# Patient Record
Sex: FEMALE | Race: WHITE | Employment: FULL TIME | URBAN - METROPOLITAN AREA
[De-identification: names, ages, dates, MRNs, and addresses within clinical notes are randomized per-mention and may not be internally consistent; named-entity substitution may affect disease eponyms.]

---

## 2017-09-25 ENCOUNTER — HOSPITAL ENCOUNTER (OUTPATIENT)
Dept: SURGERY | Age: 57
Discharge: HOME | End: 2017-09-25

## 2017-09-27 VITALS — BODY MASS INDEX: 33.43 KG/M2 | HEIGHT: 67 IN | WEIGHT: 213 LBS

## 2017-09-27 RX ORDER — BACLOFEN 10 MG/1
10 TABLET ORAL 2 TIMES DAILY
COMMUNITY
End: 2021-12-20 | Stop reason: CLARIF

## 2017-09-27 RX ORDER — NAPROXEN 500 MG/1
500 TABLET ORAL 2 TIMES DAILY WITH MEALS
COMMUNITY
End: 2021-12-20 | Stop reason: CLARIF

## 2017-09-27 RX ORDER — GABAPENTIN 300 MG/1
300 CAPSULE ORAL
COMMUNITY

## 2017-09-27 RX ORDER — LEVOTHYROXINE SODIUM 150 UG/1
100 TABLET ORAL
COMMUNITY

## 2017-09-27 RX ORDER — MULTIVIT WITH MINERALS/HERBS
1 TABLET ORAL DAILY
COMMUNITY

## 2017-09-27 RX ORDER — NEBIVOLOL 20 MG/1
20 TABLET ORAL
COMMUNITY

## 2017-09-27 RX ORDER — HYDRALAZINE HYDROCHLORIDE 50 MG/1
50 TABLET, FILM COATED ORAL 3 TIMES DAILY
COMMUNITY

## 2017-09-27 RX ORDER — DOXAZOSIN 4 MG/1
4 TABLET ORAL
COMMUNITY
End: 2021-12-20 | Stop reason: CLARIF

## 2017-09-27 RX ORDER — TRAMADOL HYDROCHLORIDE 50 MG/1
50 TABLET ORAL
COMMUNITY
End: 2021-12-20 | Stop reason: CLARIF

## 2017-09-27 RX ORDER — DILTIAZEM HYDROCHLORIDE EXTENDED-RELEASE TABLETS 240 MG/1
240 TABLET, EXTENDED RELEASE ORAL
COMMUNITY
End: 2021-12-20 | Stop reason: CLARIF

## 2017-09-27 NOTE — PERIOP NOTES
Patient verified name, , and surgery as listed in [facility]. PHONE ASSESSMENT     TYPE  CASE:N/A            Orders: received. Labs per surgeon:  None ordered    Labs per anesthesia protocol: N/A  EKG  :  N/A    Pt medications obtained  PT OVER PHONE, med bottles NOT visualized. Requested pt to validate each medication, dose and frequency while here today. Instructed patient to continue previous medications as prescribed prior to surgery and  to take the following medications the day of surgery according to anesthesia guidelines with a small sip of water : INSTRUCTED PTS TO TAKE ALL MEDS AS USUAL W/ SMALL SIP OF WATER, OTHERWISE NPO AFTER MIDNIGHT       Continue all previous medications unless otherwise directed. Instructed patient to hold  the following medications prior to surgery: NONE    Patient instructed on the following and verbalized understanding: Pt teach back was successful and pt demonstrates knowledge of instruction  Arrive at [facility] entrance, time of arrival to be called the day before by 1700. Responsible adult must drive patient to and from hospital, stay during surgery and 24 hours postoperatively. Npo after midnight including gum, mints and ice chips. Use ANTIBACTERIAL SOAP  in the shower the night before and the morning of surgery. Leave all valuables at home. Instructed on no jewelry or body piercings on the dos. Bring insurance card and ID. No perfumes, oil, powder, colognes, makeup or  lotions on the skin. Patient verbalized understanding of all instructions and provided all medical/health information to the best of their ability.

## 2017-10-11 NOTE — PERIOP NOTES
Phone update completed. Previous assessment completed 9/27/17. Pt reports no changes in health history.

## 2017-10-12 ENCOUNTER — APPOINTMENT (OUTPATIENT)
Dept: GENERAL RADIOLOGY | Age: 57
End: 2017-10-12
Attending: PAIN MEDICINE
Payer: COMMERCIAL

## 2017-10-12 ENCOUNTER — HOSPITAL ENCOUNTER (OUTPATIENT)
Age: 57
Setting detail: OUTPATIENT SURGERY
Discharge: HOME | End: 2017-10-12
Attending: PAIN MEDICINE | Admitting: PAIN MEDICINE
Payer: COMMERCIAL

## 2017-10-12 VITALS
WEIGHT: 224 LBS | TEMPERATURE: 98 F | RESPIRATION RATE: 16 BRPM | SYSTOLIC BLOOD PRESSURE: 140 MMHG | HEART RATE: 52 BPM | OXYGEN SATURATION: 98 % | DIASTOLIC BLOOD PRESSURE: 84 MMHG | BODY MASS INDEX: 35.08 KG/M2

## 2017-10-12 PROCEDURE — 74011250636 HC RX REV CODE- 250/636: Performed by: PAIN MEDICINE

## 2017-10-12 PROCEDURE — 76010000230: Performed by: PAIN MEDICINE

## 2017-10-12 PROCEDURE — 76210000021 HC REC RM PH II 0.5 TO 1 HR: Performed by: PAIN MEDICINE

## 2017-10-12 PROCEDURE — 77030014125 HC TY EPDRL BBMI -B: Performed by: PAIN MEDICINE

## 2017-10-12 PROCEDURE — 74011250636 HC RX REV CODE- 250/636

## 2017-10-12 RX ORDER — FENTANYL CITRATE 50 UG/ML
INJECTION, SOLUTION INTRAMUSCULAR; INTRAVENOUS AS NEEDED
Status: DISCONTINUED | OUTPATIENT
Start: 2017-10-12 | End: 2017-10-12 | Stop reason: HOSPADM

## 2017-10-12 RX ORDER — TRIAMCINOLONE ACETONIDE 40 MG/ML
INJECTION, SUSPENSION INTRA-ARTICULAR; INTRAMUSCULAR AS NEEDED
Status: DISCONTINUED | OUTPATIENT
Start: 2017-10-12 | End: 2017-10-12 | Stop reason: HOSPADM

## 2017-10-12 NOTE — INTERVAL H&P NOTE
H&P Update:  Phong Mathews was seen and examined. History and physical has been reviewed. The patient has been examined. There have been no significant clinical changes since the completion of the originally dated History and Physical.  Blood pressure (!) 173/107, pulse 78, temperature 98.3 °F (36.8 °C), resp. rate 18, weight 101.6 kg (224 lb), SpO2 96 %.         Signed By: Maureen Obando MD     October 12, 2017 7:47 AM

## 2017-10-12 NOTE — IP AVS SNAPSHOT
[address] 
[phone] Patient: Duane Haller MRN: [id number] :1960 You are allergic to the following Allergen Reactions Bactrim (Sulfamethoprim) Hives Codeine Hives Lortab (Hydrocodone-Acetaminophen) Hives Sulfa (Sulfonamide Antibiotics) Hives Recent Documentation Weight BMI OB Status Smoking Status 101.6 kg 35.08 kg/m2 Postmenopausal Former Smoker Emergency Contacts Name Discharge Info Relation Home Work Mobile Leon Vega  Spouse [3] [phone] About your hospitalization You were admitted on:  2017 You last received care in the:  [place] [address] You were discharged on:  2017 Unit phone number:  [phone] Why you were hospitalized Your primary diagnosis was:  Not on File Providers Seen During Your Hospitalizations Provider Role Specialty Primary office phone Rigo Alejandra MD Attending Provider Anesthesiology [phone] Your Primary Care Physician (PCP) Primary Care Physician Office Phone Office Fax [facility] [phone] Follow-up Information Follow up With Details Comments Contact Info tSephenie Mckeon MD   [facility] [address] [facility] [address] [phone] Current Discharge Medication List  
  
CONTINUE these medications which have NOT CHANGED Dose & Instructions Dispensing Information Comments Morning Noon Evening Bedtime  
 b complex vitamins tablet Your last dose was: Your next dose is:    
   
   
 Dose:  1 Tab Take 1 Tab by mouth daily. Refills:  0  
     
   
   
   
  
 baclofen 10 mg tablet Commonly known as:  LIORESAL Your last dose was: Your next dose is:    
   
   
 Dose:  10 mg Take 10 mg by mouth two (2) times a day. Refills:  0  
     
   
   
   
  
 BYSTOLIC 20 mg tablet Generic drug:  nebivolol Your last dose was: Your next dose is:    
   
   
 Dose:  20 mg Take 20 mg by mouth every morning. Refills:  0  
     
   
   
   
  
 dilTIAZem  mg Tb24 tablet Commonly known as:  CARDIZEM LA Your last dose was: Your next dose is:    
   
   
 Dose:  240 mg Take 240 mg by mouth daily (with lunch). Refills:  0  
     
   
   
   
  
 doxazosin 4 mg tablet Commonly known as:  CARDURA Your last dose was: Your next dose is:    
   
   
 Dose:  4 mg Take 4 mg by mouth nightly. Refills:  0  
     
   
   
   
  
 gabapentin 300 mg capsule Commonly known as:  NEURONTIN Your last dose was: Your next dose is:    
   
   
 Dose:  300 mg Take 300 mg by mouth three (3) times daily as needed. Refills:  0  
     
   
   
   
  
 hydrALAZINE 50 mg tablet Commonly known as:  APRESOLINE Your last dose was: Your next dose is:    
   
   
 Dose:  50 mg Take 50 mg by mouth three (3) times daily. Refills:  0  
     
   
   
   
  
 levothyroxine 150 mcg tablet Commonly known as:  SYNTHROID Your last dose was: Your next dose is:    
   
   
 Dose:  150 mcg Take 150 mcg by mouth Daily (before breakfast). Refills:  0  
     
   
   
   
  
 loratadine 10 mg Cap Your last dose was: Your next dose is: Take  by mouth nightly. Refills:  0  
     
   
   
   
  
 naproxen 500 mg tablet Commonly known as:  NAPROSYN Your last dose was: Your next dose is:    
   
   
 Dose:  500 mg Take 500 mg by mouth two (2) times daily (with meals). Refills:  0  
     
   
   
   
  
 traMADol 50 mg tablet Commonly known as:  ULTRAM  
   
Your last dose was: Your next dose is:    
   
   
 Dose:  50 mg Take 50 mg by mouth three (3) times daily as needed for Pain. Refills:  0 VITAMIN D2 PO Your last dose was: Your next dose is:    
   
   
 Dose:  1 Tab Take 1 Tab by mouth. Twice a week Refills:  0 Discharge Instructions Pain Management Aftercare Common Side Effects that may last 8-12 hours: 
Drowsiness  Slurred speech  Dizziness Poor balance  Blurred vision Hangover effect (headache, upset stomach, etc.) Aftercare Instructions: You must have a responsible adult drive you home. Do not drive a car or operate equipment for at least 12 hours. Do not take any new medications for at least 24 hours unless your doctor has prescribed them and he is aware that you are taking them. Do not drink any alcoholic beverages until the next day. Advance to your normal diet as tolerated. Expect soreness at the injection site that will improve over the next 24 hours. Avoid strenuous exercise or heavy lifting. Pre-injection activities may be resumed tomorrow (unless instructed otherwise by your doctor). Notify your doctor immediately if any of the following symptoms occur: 
Severe pain at the injection site Bleeding or drainage from injection site Fever 101 degrees F or greater New or increased weakness/numbness of extremities that does not resolve Severe headache that disappears or gets better when you lie down Breathing difficulty Skin rash Vomiting Seizures Unusual drowsiness Doctor's Phone Number: [phone] Follow-up Care:  Tuesday October 17th at 9:45 in clinic DISCHARGE SUMMARY from Nurse PATIENT INSTRUCTIONS: 
 
After general anesthesia or intravenous sedation, for 24 hours or while taking prescription Narcotics: · Limit your activities · Do not drive and operate hazardous machinery · Do not make important personal or business decisions · Do  not drink alcoholic beverages · If you have not urinated within 8 hours after discharge, please contact your surgeon on call. *  Please give a list of your current medications to your Primary Care Provider. *  Please update this list whenever your medications are discontinued, doses are 
    changed, or new medications (including over-the-counter products) are added. *  Please carry medication information at all times in case of emergency situations. These are general instructions for a healthy lifestyle: No smoking/ No tobacco products/ Avoid exposure to second hand smoke Surgeon General's Warning:  Quitting smoking now greatly reduces serious risk to your health. Obesity, smoking, and sedentary lifestyle greatly increases your risk for illness A healthy diet, regular physical exercise & weight monitoring are important for maintaining a healthy lifestyle You may be retaining fluid if you have a history of heart failure or if you experience any of the following symptoms:  Weight gain of 3 pounds or more overnight or 5 pounds in a week, increased swelling in our hands or feet or shortness of breath while lying flat in bed. Please call your doctor as soon as you notice any of these symptoms; do not wait until your next office visit. Recognize signs and symptoms of STROKE: 
 
F-face looks uneven A-arms unable to move or move unevenly S-speech slurred or non-existent T-time-call 911 as soon as signs and symptoms begin-DO NOT go Back to bed or wait to see if you get better-TIME IS BRAIN. Discharge Orders None Introducing [facility]! Kimberly العلي introduces [employer] patient portal. Now you can access parts of your medical record, email your doctor's office, and request medication refills online. 1. In your internet browser, go to [email or web address] 2. Click on the First Time User? Click Here link in the Sign In box. You will see the New Member Sign Up page. 3. Enter your Twelve Access Code exactly as it appears below.  You will not need to use this code after youve completed the sign-up process. If you do not sign up before the expiration date, you must request a new code. · "[employer] Access Code: [facility] Expires: 1/10/2018  6:08 AM 
 
4. Enter the last four digits of your Social Security Number (xxxx) and Date of Birth (mm/dd/yyyy) as indicated and click Submit. You will be taken to the next sign-up page. 5. Create a "[employer] ID. This will be your "[employer] login ID and cannot be changed, so think of one that is secure and easy to remember. 6. Create a "[employer] password. You can change your password at any time. 7. Enter your Password Reset Question and Answer. This can be used at a later time if you forget your password. 8. Enter your e-mail address. You will receive e-mail notification when new information is available in [address] 9. Click Sign Up. You can now view and download portions of your medical record. 10. Click the Download Summary menu link to download a portable copy of your medical information. If you have questions, please visit the Frequently Asked Questions section of the "[employer] website. Remember, "[employer] is NOT to be used for urgent needs. For medical emergencies, dial 911. Now available from your iPhone and Android! General Information Please provide this summary of care documentation to your next provider. Patient Signature:  ____________________________________________________________ Date:  ____________________________________________________________  
  
Sam Coy Provider Signature:  ____________________________________________________________ Date:  ____________________________________________________________

## 2017-10-12 NOTE — OP NOTES
Procedure Note    Patient: Carmen Moss MRN: [id number]  SSN: [id number]    YOB: 1960  Age: 64 y.o. Sex: female        Date of Procedure: 10/12/2017      Procedure:  Cervical epidural steroid injection number 1. Fluoroscopic imaging. Diagnoses: Cervical Disc Disease, M50.90    Cervical Radiculopathy, M54.12    Surgeon:  Blair Sandoval MD     Sedation:  IV Fentanyl    Location:  [address] [place] [address]    Indication: 64 y.o. female with a 3 month history of right upper extremity pain. MRI shows multilevel disc disease, worst at C5-C7. We are consulted by her physicians at [address] [place] [address] for an epidural steroid injection series and the patient will have the first here today. Description of Procedure: After informed consent was obtained, the patient had standard monitors applied. The patient was seated in a chair next to the procedure table with the shoulders relaxed and the neck flexed and held stationary by a nurse assistant. The C 6-7 interspace was identified by palpation and the area overlying was prepped with 3 passes of iodine and sterilely draped. Local anesthetic consisting of 3 mL of 1 percent lidocaine was injected into the skin and subcutaneous tissues. An 18 gauge Touhy needle was inserted into the interspinous ligament. A glass syringe was used to advance to the epidural space using a loss of resistance technique. At no time during the procedure was there evidence of cerebrospinal fluid or blood from the needle hub. Negative aspiration was followed by easy injection of 6.5 mL of a suspension containing 60 mg triamcinolone in preservative free normal saline without discomfort. The needle was removed without significant bleeding from the puncture site. The patient appeared to tolerate the procedure well without complications.  The patient was allowed to recover 20-30 minutes, after which, the patient was alert and oriented times 3 and able to dress and ambulate on their own. The patient was discharged in stable condition. Fluoroscopic imaging was obtained for this procedure. Fluids: None    Estimated Blood Loss: None    Events: None    Findings: Not applicable. Implants: Not applicable. Specimens: Not applicable. Follow-up: Return to clinic in two weeks for possible epidural steroid injection number two. The patient was discharged with instructions to contact us in the interim with any questions or concerns.          Signed By:  Sawyer Shannon MD     October 12, 2017       Copy: [id number] [facility] ( [phone]

## 2017-10-12 NOTE — DISCHARGE INSTRUCTIONS
Pain Management Aftercare    Common Side Effects that may last 8-12 hours:  Drowsiness  Slurred speech  Dizziness  Poor balance  Blurred vision     Hangover effect (headache, upset stomach, etc.)    Aftercare Instructions: You must have a responsible adult drive you home. Do not drive a car or operate equipment for at least 12 hours. Do not take any new medications for at least 24 hours unless your doctor has prescribed them and he is aware that you are taking them. Do not drink any alcoholic beverages until the next day. Advance to your normal diet as tolerated. Expect soreness at the injection site that will improve over the next 24 hours. Avoid strenuous exercise or heavy lifting. Pre-injection activities may be resumed tomorrow (unless instructed otherwise by your doctor). Notify your doctor immediately if any of the following symptoms occur:  Severe pain at the injection site  Bleeding or drainage from injection site  Fever 101 degrees F or greater  New or increased weakness/numbness of extremities that does not resolve  Severe headache that disappears or gets better when you lie down  Breathing difficulty  Skin rash  Vomiting  Seizures  Unusual drowsiness    Doctor's Phone Number: [phone]    Follow-up Care:  Tuesday October 17th at 9:45 in clinic      DISCHARGE SUMMARY from Nurse    PATIENT INSTRUCTIONS:    After general anesthesia or intravenous sedation, for 24 hours or while taking prescription Narcotics:  · Limit your activities  · Do not drive and operate hazardous machinery  · Do not make important personal or business decisions  · Do  not drink alcoholic beverages  · If you have not urinated within 8 hours after discharge, please contact your surgeon on call. *  Please give a list of your current medications to your Primary Care Provider.     *  Please update this list whenever your medications are discontinued, doses are      changed, or new medications (including over-the-counter products) are added. *  Please carry medication information at all times in case of emergency situations. These are general instructions for a healthy lifestyle:    No smoking/ No tobacco products/ Avoid exposure to second hand smoke    Surgeon General's Warning:  Quitting smoking now greatly reduces serious risk to your health. Obesity, smoking, and sedentary lifestyle greatly increases your risk for illness    A healthy diet, regular physical exercise & weight monitoring are important for maintaining a healthy lifestyle    You may be retaining fluid if you have a history of heart failure or if you experience any of the following symptoms:  Weight gain of 3 pounds or more overnight or 5 pounds in a week, increased swelling in our hands or feet or shortness of breath while lying flat in bed. Please call your doctor as soon as you notice any of these symptoms; do not wait until your next office visit. Recognize signs and symptoms of STROKE:    F-face looks uneven    A-arms unable to move or move unevenly    S-speech slurred or non-existent    T-time-call 911 as soon as signs and symptoms begin-DO NOT go       Back to bed or wait to see if you get better-TIME IS BRAIN.

## 2017-10-12 NOTE — H&P (VIEW-ONLY)
[facility], [facility]  NEW PATIENT EVALUATION    PATIENT: Anne Juarez   DOS: 09/05/17    PHYSICIAN: Ese Almanzar Pat Hill  DOD: 09/05/17    SSN: [id number]   DOT: 09/06/17      CONSULTATION/HISTORY & PHYSICAL    REFERRING PROVIDER:  Radha Geller PA-C    HISTORY:  This is a very pleasant 51-year-old female patient, who is referred to this clinic by Ms. Geller of [facility]. Onset of her pain was 07/05/2017. Patient reports waking up to find pain from the base of her neck that radiated into the arm and shoulder blade on the right side. Pain is aching and throbbing with sharp burning sensations. There was tingling and numbness, but that has resolved. She had a similar problem in the past approximately a year ago, but this tend to resolve on its own. Patient has had physical therapy at [facility] in [address]. No chiropractor care has been performed. No TENS unit has been used. Therapeutic injections have not been performed in the past.  Pain is exacerbated by any type of physical activity and is relieved by heating pad and lying down. There is weakness in the extremities, and patient has not been dropping items as a result. There is no incontinence of bowel and bladder, and this has been stable lately. Patient presents today for assessment, discussion, and treatment options. PAST MEDICAL HISTORY:  Hypothyroidism, hypertension, vitamin D deficiency, neck pain, muscle spasms, neuropathic pain. SOCIAL HISTORY:  , education--high school graduate, occupation--. Denies tobacco, denies alcohol, denies marijuana, denies illicit substance. ALLERGIES:  Bactrim/Sulfa--hives, Lortab/codeine--hives.     CURRENT MEDICATIONS:  Synthroid, diltiazem, Bystolic, hydralazine, prazosin, vitamin D2, Super B-Complex, tramadol, baclofen, naproxen, gabapentin 100 mg b.i.d.--ineffective thus far.  No antiplatelet/no anticoagulant medications. REVIEW OF SYSTEMS:  CONST:  No weight change, fevers, illness. CV:  No chest pain, no palpitations, no pedal edema. RESP:  No cough, no wheeze, no SOB. GI:  No ulcers, no N/V, no change in bowel habits. /Renal:  No kidney or bladder problems. MS:  See above. SKIN/INTEG:  Negative. HEME:  No antiplatelet or anticoagulants; no hemophilia, bruising, or bleeding tendencies. NEURO:  No new deficits. PSYCH:  Negative. ALL:  No new. No side effects from meds. Otherwise noncontributory. Ten-point review of systems is negative except as indicated in the Interim History above. PHYSICAL EXAM:   HT:  67 inches. WT:  213 lbs. BP:  152/92 (second taking). HR:  68.  RR:  14.  Head & Neck:  Normocephalic/atraumatic. Cranial nerves II through XII are grossly intact. Diffuse tenderness over cervical facet joints bilaterally particularly on right at C3-C4, C4-C5, and C5-C6. Spurling's maneuver is positive on right with reproduction of paresthesias in the C6 and C7 dermatomal distribution, unremarkable on left. Hand  is 3+/5 on right and 4+/5 on left. Triceps flexion and biceps extension are grossly normal.  Chest & Abdomen:  Heart:  Regular rate and rhythm; no JVD, no cyanosis, no clubbing noted. Respiratory:  Respirations are regular, even, and nonlabored; no rales, crackles, or dyspnea on exertion noted. Abdomen:  Soft, nontender, nondistended; benign. MSK:  Back:  Diffuse cervical axial spine tenderness. Diffuse lumbar axial spine tenderness. Extremities:  Reproduction of paresthesias in the right upper extremity as described above (positive Spurling's), unremarkable on left. Otherwise, no new focal motor or sensory deficits; distal pulses palpable throughout; deep tendon reflexes are symmetric and nonpathologic throughout. Psych:  Alert and oriented x3. Appropriate affect. Intact judgment and insight.       IMAGING:  MRI of the cervical spine without contrast (08/04/2017), impression,  1. \"Multilevel degenerative disc and facet disease without central stenosis, normal spinal cord signal.  2. Bilateral foraminal narrowing of varying severity as noted in detail above. Evaluation of severity of the foraminal narrowing is limited by significant motion degradation of vascular images, most evident at C5-C6 and C6-C7. \"    IMPRESSION:    1. Cervical axial spine pain secondary to cervical facet degenerative joint disease--problematic, but tolerable. 2. Right upper extremity radicular pain secondary to cervical degenerative disc disease--unstable. PLAN:    1. Return for a cervical epidural steroid injection with Dr. Elidia Etienne at the [address] [facility]--M54.12.  2. Initiate gabapentin 300 mg, p.o. t.i.d. If this causes too much somnolence during the day, she may take 100 mg b.i.d. and 300 mg q.h.s.    3. Return for followup in one month or p.r.n. I spent majority of the 25 minutes today in counseling the patient on diagnoses, treatment options, and prognosis. ______________________________  Odilia Collado. Kenji Robert   CKB/DTI  SP  [id number]     cc: Christina Palacios, [address] [place], [address] [facility]    Phone# [phone]   Fax# [phone]

## 2018-02-06 NOTE — H&P
[facility], [facility]  HISTORY & PHYSICAL EXAM    PATIENT: Anne Juarez   DOS: 10/12/17    PHYSICIAN: Renetta Montenegro MD     SSN: [id number]         CONSULTATION/HISTORY & PHYSICAL    REFERRING PROVIDER:  [facility]    HISTORY:  This is a very pleasant 59-year-old female patient, who is referred to this clinic by Ms. Geller of [facility] [employer]. Onset of her pain was 07/05/2017. Patient reports waking up to find pain from the base of her neck that radiated into the arm and shoulder blade on the right side. Pain is aching and throbbing with sharp burning sensations. There was tingling and numbness, but that has resolved. She had a similar problem in the past approximately a year ago, but this tend to resolve on its own. Patient has had physical therapy at [facility] in [facility]. No chiropractor care has been performed. No TENS unit has been used. Therapeutic injections have not been performed in the past.  Pain is exacerbated by any type of physical activity and is relieved by heating pad and lying down. There is weakness in the extremities, and patient has not been dropping items as a result. There is no incontinence of bowel and bladder, and this has been stable lately. Patient presents today for assessment, discussion, and treatment options. PAST MEDICAL HISTORY:  Hypothyroidism, hypertension, vitamin D deficiency, neck pain, muscle spasms, neuropathic pain. SOCIAL HISTORY:  , education--high school graduate, occupation--. Denies tobacco, denies alcohol, denies marijuana, denies illicit substance. ALLERGIES:  Bactrim/Sulfa--hives, Lortab/codeine--hives. CURRENT MEDICATIONS:  Synthroid, diltiazem, Bystolic, hydralazine, prazosin, vitamin D2, Super B-Complex, tramadol, baclofen, naproxen, gabapentin 100 mg b.i.d.--ineffective thus far.   No antiplatelet/no anticoagulant medications. REVIEW OF SYSTEMS:  CONST:  No weight change, fevers, illness. CV:  No chest pain, no palpitations, no pedal edema. RESP:  No cough, no wheeze, no SOB. GI:  No ulcers, no N/V, no change in bowel habits. /Renal:  No kidney or bladder problems. MS:  See above. SKIN/INTEG:  Negative. HEME:  No antiplatelet or anticoagulants; no hemophilia, bruising, or bleeding tendencies. NEURO:  No new deficits. PSYCH:  Negative. ALL:  No new. No side effects from meds. Otherwise noncontributory. PHYSICAL EXAM:       Head & Neck:  Normocephalic/atraumatic. Cranial nerves II through XII are grossly intact. Diffuse tenderness over cervical facet joints bilaterally particularly on right at C3-C4, C4-C5, and C5-C6. Spurling's maneuver is positive on right with reproduction of paresthesias in the C6 and C7 dermatomal distribution, unremarkable on left. Hand  is 3+/5 on right and 4+/5 on left. Triceps flexion and biceps extension are grossly normal.  Chest & Abdomen:  Heart:  Regular rate and rhythm; no JVD, no cyanosis, no clubbing noted. Respiratory:  Respirations are regular, even, and nonlabored; no rales, crackles, or dyspnea on exertion noted. Abdomen:  Soft, nontender, nondistended; benign. MSK:  Back:  Diffuse cervical axial spine tenderness. Diffuse lumbar axial spine tenderness. Extremities:  Reproduction of paresthesias in the right upper extremity as described above (positive Spurling's), unremarkable on left. Otherwise, no new focal motor or sensory deficits; distal pulses palpable throughout; deep tendon reflexes are symmetric and nonpathologic throughout. Psych:  Alert and oriented x3. Appropriate affect. Intact judgment and insight. IMAGING:  MRI of the cervical spine without contrast (08/04/2017), impression,  1.  \"Multilevel degenerative disc and facet disease without central stenosis, normal spinal cord signal.  2. Bilateral foraminal narrowing of varying severity as noted in detail above. Evaluation of severity of the foraminal narrowing is limited by significant motion degradation of vascular images, most evident at C5-C6 and C6-C7. \"    IMPRESSION:    1. Cervical axial spine pain secondary to cervical facet degenerative joint disease--problematic, but tolerable. 2. Right upper extremity radicular pain secondary to cervical degenerative disc disease--unstable. PLAN:    1. Here for a cervical epidural steroid injection at the [address] [facility]--M54.12.  2. Continue gabapentin 300 mg, p.o. t.i.d.    3. Return for follow-up in clinic as scheduled       Electronically signed by:  Brandy Miranda MD  Signature applied at the time of the creation of the document.     Copy: [employer]   ( [phone]

## 2021-12-08 ENCOUNTER — HOSPITAL ENCOUNTER (OUTPATIENT)
Dept: LAB | Age: 61
Discharge: HOME | End: 2021-12-08

## 2021-12-08 ENCOUNTER — HOSPITAL ENCOUNTER (OUTPATIENT)
Dept: CT IMAGING | Age: 61
Discharge: HOME | End: 2021-12-08
Attending: INTERNAL MEDICINE
Payer: COMMERCIAL

## 2021-12-08 ENCOUNTER — TRANSCRIBE ORDER (OUTPATIENT)
Dept: SCHEDULING | Age: 61
End: 2021-12-08

## 2021-12-08 DIAGNOSIS — D50.9 IRON DEFICIENCY ANEMIA: ICD-10-CM

## 2021-12-08 DIAGNOSIS — K63.89 COLONIC MASS: ICD-10-CM

## 2021-12-08 DIAGNOSIS — K63.89 COLONIC MASS: Primary | ICD-10-CM

## 2021-12-08 LAB — CREAT BLD-MCNC: 1.25 MG/DL (ref 0.8–1.5)

## 2021-12-08 PROCEDURE — 74177 CT ABD & PELVIS W/CONTRAST: CPT

## 2021-12-08 PROCEDURE — 82565 ASSAY OF CREATININE: CPT

## 2021-12-08 PROCEDURE — 88305 TISSUE EXAM BY PATHOLOGIST: CPT

## 2021-12-08 PROCEDURE — 74011000258 HC RX REV CODE- 258: Performed by: INTERNAL MEDICINE

## 2021-12-08 PROCEDURE — 74011000636 HC RX REV CODE- 636: Performed by: INTERNAL MEDICINE

## 2021-12-08 RX ORDER — SODIUM CHLORIDE 0.9 % (FLUSH) 0.9 %
10 SYRINGE (ML) INJECTION
Status: DISCONTINUED | OUTPATIENT
Start: 2021-12-08 | End: 2021-12-09 | Stop reason: HOSPADM

## 2021-12-08 RX ADMIN — SODIUM CHLORIDE 100 ML: 9 INJECTION, SOLUTION INTRAVENOUS at 13:28

## 2021-12-08 RX ADMIN — IOPAMIDOL 100 ML: 755 INJECTION, SOLUTION INTRAVENOUS at 13:27

## 2021-12-08 RX ADMIN — DIATRIZOATE MEGLUMINE AND DIATRIZOATE SODIUM 15 ML: 660; 100 LIQUID ORAL; RECTAL at 13:28

## 2021-12-17 PROBLEM — C18.3 MALIGNANT NEOPLASM OF HEPATIC FLEXURE (HCC): Status: ACTIVE | Noted: 2021-12-17

## 2021-12-17 RX ORDER — SODIUM CHLORIDE 0.9 % (FLUSH) 0.9 %
5-40 SYRINGE (ML) INJECTION EVERY 8 HOURS
Status: CANCELLED | OUTPATIENT
Start: 2021-12-17

## 2021-12-17 RX ORDER — SODIUM CHLORIDE 0.9 % (FLUSH) 0.9 %
5-40 SYRINGE (ML) INJECTION AS NEEDED
Status: CANCELLED | OUTPATIENT
Start: 2021-12-17

## 2021-12-20 ENCOUNTER — HOSPITAL ENCOUNTER (OUTPATIENT)
Dept: CT IMAGING | Age: 61
Discharge: HOME | DRG: 330 | End: 2021-12-20
Attending: SURGERY
Payer: COMMERCIAL

## 2021-12-20 ENCOUNTER — HOSPITAL ENCOUNTER (OUTPATIENT)
Dept: GENERAL RADIOLOGY | Age: 61
Discharge: HOME | End: 2021-12-20
Attending: SURGERY

## 2021-12-20 ENCOUNTER — APPOINTMENT (OUTPATIENT)
Dept: CT IMAGING | Age: 61
DRG: 330 | End: 2021-12-20
Attending: SURGERY
Payer: COMMERCIAL

## 2021-12-20 ENCOUNTER — HOSPITAL ENCOUNTER (OUTPATIENT)
Dept: SURGERY | Age: 61
Discharge: HOME | DRG: 330 | End: 2021-12-20
Payer: COMMERCIAL

## 2021-12-20 VITALS
TEMPERATURE: 96 F | RESPIRATION RATE: 18 BRPM | HEIGHT: 66 IN | WEIGHT: 209.5 LBS | HEART RATE: 60 BPM | BODY MASS INDEX: 33.67 KG/M2

## 2021-12-20 DIAGNOSIS — C18.3 MALIGNANT NEOPLASM OF HEPATIC FLEXURE (HCC): ICD-10-CM

## 2021-12-20 LAB
ALBUMIN SERPL-MCNC: 4 G/DL (ref 3.2–4.6)
ALBUMIN/GLOB SERPL: 1.1 {RATIO} (ref 1.2–3.5)
ALP SERPL-CCNC: 72 U/L (ref 50–136)
ALT SERPL-CCNC: 21 U/L (ref 12–65)
ANION GAP SERPL CALC-SCNC: 4 MMOL/L (ref 7–16)
APPEARANCE UR: CLEAR
AST SERPL-CCNC: 13 U/L (ref 15–37)
ATRIAL RATE: 51 BPM
BACTERIA URNS QL MICRO: 0 /HPF
BASOPHILS # BLD: 0 K/UL (ref 0–0.2)
BASOPHILS NFR BLD: 0 % (ref 0–2)
BILIRUB SERPL-MCNC: 0.4 MG/DL (ref 0.2–1.1)
BILIRUB UR QL: NEGATIVE
BUN SERPL-MCNC: 7 MG/DL (ref 8–23)
CALCIUM SERPL-MCNC: 9.9 MG/DL (ref 8.3–10.4)
CALCULATED P AXIS, ECG09: 45 DEGREES
CALCULATED R AXIS, ECG10: 15 DEGREES
CALCULATED T AXIS, ECG11: -18 DEGREES
CASTS URNS QL MICRO: ABNORMAL /LPF
CEA SERPL-MCNC: 1.9 NG/ML (ref 0–3)
CHLORIDE SERPL-SCNC: 108 MMOL/L (ref 98–107)
CO2 SERPL-SCNC: 29 MMOL/L (ref 21–32)
COLOR UR: YELLOW
CREAT SERPL-MCNC: 1.14 MG/DL (ref 0.6–1)
DIAGNOSIS, 93000: NORMAL
DIFFERENTIAL METHOD BLD: ABNORMAL
EOSINOPHIL # BLD: 0.1 K/UL (ref 0–0.8)
EOSINOPHIL NFR BLD: 1 % (ref 0.5–7.8)
EPI CELLS #/AREA URNS HPF: ABNORMAL /HPF
ERYTHROCYTE [DISTWIDTH] IN BLOOD BY AUTOMATED COUNT: 14.5 % (ref 11.9–14.6)
GLOBULIN SER CALC-MCNC: 3.7 G/DL (ref 2.3–3.5)
GLUCOSE SERPL-MCNC: 74 MG/DL (ref 65–100)
GLUCOSE UR STRIP.AUTO-MCNC: NEGATIVE MG/DL
HCT VFR BLD AUTO: 44.2 % (ref 35.8–46.3)
HGB BLD-MCNC: 13.8 G/DL (ref 11.7–15.4)
HGB UR QL STRIP: NEGATIVE
IMM GRANULOCYTES # BLD AUTO: 0 K/UL (ref 0–0.5)
IMM GRANULOCYTES NFR BLD AUTO: 0 % (ref 0–5)
INR PPP: 1
KETONES UR QL STRIP.AUTO: NEGATIVE MG/DL
LEUKOCYTE ESTERASE UR QL STRIP.AUTO: ABNORMAL
LYMPHOCYTES # BLD: 1.2 K/UL (ref 0.5–4.6)
LYMPHOCYTES NFR BLD: 13 % (ref 13–44)
MCH RBC QN AUTO: 28 PG (ref 26.1–32.9)
MCHC RBC AUTO-ENTMCNC: 31.2 G/DL (ref 31.4–35)
MCV RBC AUTO: 89.8 FL (ref 79.6–97.8)
MONOCYTES # BLD: 0.5 K/UL (ref 0.1–1.3)
MONOCYTES NFR BLD: 5 % (ref 4–12)
NEUTS SEG # BLD: 7.4 K/UL (ref 1.7–8.2)
NEUTS SEG NFR BLD: 81 % (ref 43–78)
NITRITE UR QL STRIP.AUTO: NEGATIVE
NRBC # BLD: 0 K/UL (ref 0–0.2)
P-R INTERVAL, ECG05: 140 MS
PH UR STRIP: 6.5 [PH] (ref 5–9)
PLATELET # BLD AUTO: 250 K/UL (ref 150–450)
PMV BLD AUTO: 10.8 FL (ref 9.4–12.3)
POTASSIUM SERPL-SCNC: 3.7 MMOL/L (ref 3.5–5.1)
PROT SERPL-MCNC: 7.7 G/DL (ref 6.3–8.2)
PROT UR STRIP-MCNC: NEGATIVE MG/DL
PROTHROMBIN TIME: 13.3 SEC (ref 12.6–14.5)
Q-T INTERVAL, ECG07: 402 MS
QRS DURATION, ECG06: 90 MS
QTC CALCULATION (BEZET), ECG08: 370 MS
RBC # BLD AUTO: 4.92 M/UL (ref 4.05–5.2)
RBC #/AREA URNS HPF: ABNORMAL /HPF
SODIUM SERPL-SCNC: 141 MMOL/L (ref 136–145)
SP GR UR REFRACTOMETRY: 1 (ref 1–1.02)
UROBILINOGEN UR QL STRIP.AUTO: 0.2 EU/DL (ref 0.2–1)
VENTRICULAR RATE, ECG03: 51 BPM
WBC # BLD AUTO: 9.1 K/UL (ref 4.3–11.1)
WBC URNS QL MICRO: ABNORMAL /HPF

## 2021-12-20 PROCEDURE — 85025 COMPLETE CBC W/AUTO DIFF WBC: CPT

## 2021-12-20 PROCEDURE — 82378 CARCINOEMBRYONIC ANTIGEN: CPT

## 2021-12-20 PROCEDURE — 85610 PROTHROMBIN TIME: CPT

## 2021-12-20 PROCEDURE — 93005 ELECTROCARDIOGRAM TRACING: CPT

## 2021-12-20 PROCEDURE — 36415 COLL VENOUS BLD VENIPUNCTURE: CPT

## 2021-12-20 PROCEDURE — 74011000636 HC RX REV CODE- 636: Performed by: SURGERY

## 2021-12-20 PROCEDURE — 71260 CT THORAX DX C+: CPT

## 2021-12-20 PROCEDURE — 80053 COMPREHEN METABOLIC PANEL: CPT

## 2021-12-20 PROCEDURE — 71046 X-RAY EXAM CHEST 2 VIEWS: CPT

## 2021-12-20 PROCEDURE — 81001 URINALYSIS AUTO W/SCOPE: CPT

## 2021-12-20 PROCEDURE — 74011000258 HC RX REV CODE- 258: Performed by: SURGERY

## 2021-12-20 RX ORDER — SODIUM CHLORIDE 0.9 % (FLUSH) 0.9 %
10 SYRINGE (ML) INJECTION
Status: COMPLETED | OUTPATIENT
Start: 2021-12-20 | End: 2021-12-20

## 2021-12-20 RX ADMIN — SODIUM CHLORIDE 100 ML: 900 INJECTION, SOLUTION INTRAVENOUS at 15:18

## 2021-12-20 RX ADMIN — Medication 10 ML: at 15:17

## 2021-12-20 RX ADMIN — IOPAMIDOL 80 ML: 755 INJECTION, SOLUTION INTRAVENOUS at 15:17

## 2021-12-20 NOTE — PERIOP NOTES
Enhanced Recovery After Surgery: non-diabetic patients    Drink Ensure Surgery Immunonutrition  - one bottle twice daily for 5 days prior to surgery . Do not drink any Ensure Surgery Immunonutrition the day before surgery 12/22/21. Ensure Surgery Immunonutrition is the preferred formula over other Ensure formulas as it is the only one that is designed to support immune health and recovery from surgery. It is recommended that you continue drinking this for 7 days after surgery. The night before surgery 12/22/21, drink 2 bottles of the Ensure Pre-Surgery drink. The morning of surgery 12/23/21, drink one bottle of the Ensure Pre-Surgery drink while on  your way to the hospital. Drink this over 5-10 minutes. Drink nothing else after drinking the pre-surgical drink the morning of surgery. Bring your patient handbook with you to the hospital.      Things to remember:    1. You will be up on a chair the evening of surgery and drinking clear liquids. Your diet will be advanced by your surgeon as appropriate. 2. Beginning the day after surgery, you will be up in a chair for all meals. 3. Beginning the day after surgery, you will be out of the bed for a minimum of 6 hours (not all at one time)    4. Beginning the day after surgery, you will walk in the valdez in the valdez at least 50' at least three times a day. 5. You will be given scheduled non-narcotic pain medication to help keep your pain under control. You will have stronger pain medication ordered for break through pain. 6. All of these measures are geared toward returning your bowel to normal function as soon as possible and to prevent complications associated with bowel blockage, blood clots, and/or pneumonia.

## 2021-12-20 NOTE — PERIOP NOTES
Recent Results (from the past 12 hour(s))   URINALYSIS W/ RFLX MICROSCOPIC    Collection Time: 12/20/21 12:14 PM   Result Value Ref Range    Color YELLOW      Appearance CLEAR      Specific gravity 1.001 1.001 - 1.023      pH (UA) 6.5 5.0 - 9.0      Protein Negative NEG mg/dL    Glucose Negative mg/dL    Ketone Negative NEG mg/dL    Bilirubin Negative NEG      Blood Negative NEG      Urobilinogen 0.2 0.2 - 1.0 EU/dL    Nitrites Negative NEG      Leukocyte Esterase SMALL (A) NEG      WBC 5-10 0 /hpf    RBC 0-3 0 /hpf    Epithelial cells 0-3 0 /hpf    Bacteria 0 0 /hpf    Casts 0-3 0 /lpf   CBC WITH AUTOMATED DIFF    Collection Time: 12/20/21 12:58 PM   Result Value Ref Range    WBC 9.1 4.3 - 11.1 K/uL    RBC 4.92 4.05 - 5.2 M/uL    HGB 13.8 11.7 - 15.4 g/dL    HCT 44.2 35.8 - 46.3 %    MCV 89.8 79.6 - 97.8 FL    MCH 28.0 26.1 - 32.9 PG    MCHC 31.2 (L) 31.4 - 35.0 g/dL    RDW 14.5 11.9 - 14.6 %    PLATELET 356 670 - 064 K/uL    MPV 10.8 9.4 - 12.3 FL    ABSOLUTE NRBC 0.00 0.0 - 0.2 K/uL    DF AUTOMATED      NEUTROPHILS 81 (H) 43 - 78 %    LYMPHOCYTES 13 13 - 44 %    MONOCYTES 5 4.0 - 12.0 %    EOSINOPHILS 1 0.5 - 7.8 %    BASOPHILS 0 0.0 - 2.0 %    IMMATURE GRANULOCYTES 0 0.0 - 5.0 %    ABS. NEUTROPHILS 7.4 1.7 - 8.2 K/UL    ABS. LYMPHOCYTES 1.2 0.5 - 4.6 K/UL    ABS. MONOCYTES 0.5 0.1 - 1.3 K/UL    ABS. EOSINOPHILS 0.1 0.0 - 0.8 K/UL    ABS. BASOPHILS 0.0 0.0 - 0.2 K/UL    ABS. IMM.  GRANS. 0.0 0.0 - 0.5 K/UL   METABOLIC PANEL, COMPREHENSIVE    Collection Time: 12/20/21 12:58 PM   Result Value Ref Range    Sodium 141 136 - 145 mmol/L    Potassium 3.7 3.5 - 5.1 mmol/L    Chloride 108 (H) 98 - 107 mmol/L    CO2 29 21 - 32 mmol/L    Anion gap 4 (L) 7 - 16 mmol/L    Glucose 74 65 - 100 mg/dL    BUN 7 (L) 8 - 23 MG/DL    Creatinine 1.14 (H) 0.6 - 1.0 MG/DL    GFR est AA >60 >60 ml/min/1.73m2    GFR est non-AA 52 (L) >60 ml/min/1.73m2    Calcium 9.9 8.3 - 10.4 MG/DL    Bilirubin, total 0.4 0.2 - 1.1 MG/DL    ALT (SGPT) 21 12 - 65 U/L    AST (SGOT) 13 (L) 15 - 37 U/L    Alk.  phosphatase 72 50 - 136 U/L    Protein, total 7.7 6.3 - 8.2 g/dL    Albumin 4.0 3.2 - 4.6 g/dL    Globulin 3.7 (H) 2.3 - 3.5 g/dL    A-G Ratio 1.1 (L) 1.2 - 3.5     PROTHROMBIN TIME + INR    Collection Time: 12/20/21 12:58 PM   Result Value Ref Range    Prothrombin time 13.3 12.6 - 14.5 sec    INR 1.0     EKG, 12 LEAD, INITIAL    Collection Time: 12/20/21  1:27 PM   Result Value Ref Range    Ventricular Rate 51 BPM    Atrial Rate 51 BPM    P-R Interval 140 ms    QRS Duration 90 ms    Q-T Interval 402 ms    QTC Calculation (Bezet) 370 ms    Calculated P Axis 45 degrees    Calculated R Axis 15 degrees    Calculated T Axis -18 degrees    Diagnosis       Sinus bradycardia  T wave abnormality, consider inferior ischemia  T wave abnormality, consider anterior ischemia  Abnormal ECG  When compared with ECG of 11-JUL-2007 19:28,  No significant change was found  Confirmed by [facility] RONNI & JUAN RAMON [facility] [place]  MD ()Hang ([id number]) on 12/20/2021 1:10:16 PM     Reviewed  CEA pending

## 2021-12-20 NOTE — PROGRESS NOTES
Met with patient at [facility] to educate on ERAS bowel surgery planned for 12-23-21. Patient with ERAS handbook provided at surgeon's office appointment. Patient stated has read the handbook. Provided an overview of the main components of the program- decreased fasting, early PO intake,early ambulation, and multi- modal approach to pain control. Educated on the benefits of drinking the Ensure Surgery Immunonutrition shakes pre and post surgery. Patient verbalized understanding of what to expect before and after surgery, and with discharge. Emphasized that this is a collaborative program between healthcare professionals, the patient, and family/caregivers. The number of days the patient will consume the Ensure Surgery Immunonutrition shakes before surgery will be slightly modified due to the surgery day is scheduled 3 days after pre-assessment.

## 2021-12-20 NOTE — PERIOP NOTES
Patient verified name and     Order for consent  found in EHR and matches case posting; patient verified. Type 2 surgery, phone assessment complete. Labs per surgeon: cbc,bmp,ua,pt,ptt,cea; results pending  Labs per anesthesia protocol: hgb,K+; results pending  EK21 Call placed to Dr Lora Easley to review EKG. EKG approved    Patient COVID test date ; . The testing center is located at the . [address] , [place]. If appointment is needed patient provided telephone number of [phone]. Hospital approved surgical skin cleanser and instructions given per hospital policy. Patient provided with and instructed on educational handouts including Guide to Surgery, Pain Management, Hand Hygiene, Blood Transfusion Education, and [facility] Brochure. Patient answered medical/surgical history questions at their best of ability. All prior to admission medications documented in [facility]. Original medication prescription bottle not visualized during patient appointment. Patient instructed to hold all vitamins 7 days prior to surgery and NSAIDS 5 days prior to surgery, patient verbalized understanding. Patient teach back successful and patient demonstrates knowledge of instructions.

## 2021-12-20 NOTE — PERIOP NOTES
PLEASE CONTINUE TAKING ALL PRESCRIPTION MEDICATIONS UP TO THE DAY OF SURGERY UNLESS OTHERWISE DIRECTED BELOW. DISCONTINUE all vitamins and supplements 7 days prior to surgery. DISCONTINUE Non-Steriodal Anti-Inflammatory (NSAIDS) such as Advil and Aleve 5 days prior to surgery. Home Medications to take  the day of surgery    Arimidex, Levothyroxine, Bystolic, Hydralazine, Tiadylt            Home Medications   to Hold           Comments    Covid test 12/20/21 @ [address]    On the day before surgery please take Acetaminophen 1000mg in the morning and then again before bed. You may substitute for Tylenol 650 mg. Please do not bring home medications with you on the day of surgery unless otherwise directed by your nurse. If you are instructed to bring home medications, please give them to your nurse as they will be administered by the nursing staff. If you have any questions, please call [facility] Regina Cooper [phone] or [facility] [phone]. A copy of this note was provided to the patient for reference.

## 2021-12-22 ENCOUNTER — ANESTHESIA EVENT (OUTPATIENT)
Dept: SURGERY | Age: 61
DRG: 330 | End: 2021-12-22
Payer: COMMERCIAL

## 2021-12-23 ENCOUNTER — ANESTHESIA (OUTPATIENT)
Dept: SURGERY | Age: 61
DRG: 330 | End: 2021-12-23
Payer: COMMERCIAL

## 2021-12-23 ENCOUNTER — HOSPITAL ENCOUNTER (INPATIENT)
Age: 61
LOS: 4 days | Discharge: HOME | DRG: 330 | End: 2021-12-27
Attending: SURGERY | Admitting: SURGERY
Payer: COMMERCIAL

## 2021-12-23 DIAGNOSIS — C18.3 MALIGNANT NEOPLASM OF HEPATIC FLEXURE (HCC): Primary | ICD-10-CM

## 2021-12-23 PROBLEM — C18.9 COLON CANCER (HCC): Status: ACTIVE | Noted: 2021-12-23

## 2021-12-23 LAB
ABO + RH BLD: NORMAL
BLOOD GROUP ANTIBODIES SERPL: NORMAL
SPECIMEN EXP DATE BLD: NORMAL

## 2021-12-23 PROCEDURE — 2709999900 HC NON-CHARGEABLE SUPPLY: Performed by: SURGERY

## 2021-12-23 PROCEDURE — 77030002966 HC SUT PDS J&J -A: Performed by: SURGERY

## 2021-12-23 PROCEDURE — 07BB4ZZ EXCISION OF MESENTERIC LYMPHATIC, PERCUTANEOUS ENDOSCOPIC APPROACH: ICD-10-PCS | Performed by: SURGERY

## 2021-12-23 PROCEDURE — 74011250636 HC RX REV CODE- 250/636: Performed by: SURGERY

## 2021-12-23 PROCEDURE — 44205 LAP COLECTOMY PART W/ILEUM: CPT | Performed by: SURGERY

## 2021-12-23 PROCEDURE — 74011250636 HC RX REV CODE- 250/636: Performed by: ANESTHESIOLOGY

## 2021-12-23 PROCEDURE — 77030011808 HC STPLR ENDOSCOPIC J&J -D: Performed by: SURGERY

## 2021-12-23 PROCEDURE — 77030016151 HC PROTCTR LNS DFOG COVD -B: Performed by: SURGERY

## 2021-12-23 PROCEDURE — 86901 BLOOD TYPING SEROLOGIC RH(D): CPT

## 2021-12-23 PROCEDURE — 2709999900 HC NON-CHARGEABLE SUPPLY

## 2021-12-23 PROCEDURE — 77030019908 HC STETH ESOPH SIMS -A: Performed by: ANESTHESIOLOGY

## 2021-12-23 PROCEDURE — 77030009979 HC RELD STPLR TCR J&J -C: Performed by: SURGERY

## 2021-12-23 PROCEDURE — 74011250637 HC RX REV CODE- 250/637: Performed by: ANESTHESIOLOGY

## 2021-12-23 PROCEDURE — 77030031139 HC SUT VCRL2 J&J -A: Performed by: SURGERY

## 2021-12-23 PROCEDURE — 88305 TISSUE EXAM BY PATHOLOGIST: CPT

## 2021-12-23 PROCEDURE — 77030020829: Performed by: SURGERY

## 2021-12-23 PROCEDURE — 88307 TISSUE EXAM BY PATHOLOGIST: CPT

## 2021-12-23 PROCEDURE — 77030008756 HC TU IRR SUC STRY -B: Performed by: SURGERY

## 2021-12-23 PROCEDURE — 74011000250 HC RX REV CODE- 250: Performed by: NURSE ANESTHETIST, CERTIFIED REGISTERED

## 2021-12-23 PROCEDURE — 77030016154: Performed by: SURGERY

## 2021-12-23 PROCEDURE — 88309 TISSUE EXAM BY PATHOLOGIST: CPT

## 2021-12-23 PROCEDURE — 65270000029 HC RM PRIVATE

## 2021-12-23 PROCEDURE — 77030021158 HC TRCR BLN GELPRT AMR -B: Performed by: SURGERY

## 2021-12-23 PROCEDURE — 74011250637 HC RX REV CODE- 250/637: Performed by: SURGERY

## 2021-12-23 PROCEDURE — 77030040361 HC SLV COMPR DVT MDII -B: Performed by: SURGERY

## 2021-12-23 PROCEDURE — 77030034628 HC LIGASURE LAP SEAL DIV MD COVD -F: Performed by: SURGERY

## 2021-12-23 PROCEDURE — 77030039425 HC BLD LARYNG TRULITE DISP TELE -A: Performed by: ANESTHESIOLOGY

## 2021-12-23 PROCEDURE — 0DTF4ZZ RESECTION OF RIGHT LARGE INTESTINE, PERCUTANEOUS ENDOSCOPIC APPROACH: ICD-10-PCS | Performed by: SURGERY

## 2021-12-23 PROCEDURE — 74011250636 HC RX REV CODE- 250/636: Performed by: NURSE ANESTHETIST, CERTIFIED REGISTERED

## 2021-12-23 PROCEDURE — 77030008462 HC STPLR SKN PROX J&J -A: Performed by: SURGERY

## 2021-12-23 PROCEDURE — 77030040922 HC BLNKT HYPOTHRM STRY -A: Performed by: ANESTHESIOLOGY

## 2021-12-23 PROCEDURE — 77030012770 HC TRCR OPT FX AMR -B: Performed by: SURGERY

## 2021-12-23 PROCEDURE — 76010000172 HC OR TIME 2.5 TO 3 HR INTENSV-TIER 1: Performed by: SURGERY

## 2021-12-23 PROCEDURE — 76060000036 HC ANESTHESIA 2.5 TO 3 HR: Performed by: SURGERY

## 2021-12-23 PROCEDURE — 77030036731 HC STPLR ENDOSC J&J -F: Performed by: SURGERY

## 2021-12-23 PROCEDURE — 77030034850: Performed by: SURGERY

## 2021-12-23 PROCEDURE — 77030002996 HC SUT SLK J&J -A: Performed by: SURGERY

## 2021-12-23 PROCEDURE — 76210000006 HC OR PH I REC 0.5 TO 1 HR: Performed by: SURGERY

## 2021-12-23 PROCEDURE — 77030008518 HC TBNG INSUF ENDO STRY -B: Performed by: SURGERY

## 2021-12-23 PROCEDURE — 77030000038 HC TIP SCIS LAPSCP SURI -B: Performed by: SURGERY

## 2021-12-23 PROCEDURE — 77030037088 HC TUBE ENDOTRACH ORAL NSL COVD-A: Performed by: ANESTHESIOLOGY

## 2021-12-23 RX ORDER — FLUMAZENIL 0.1 MG/ML
0.2 INJECTION INTRAVENOUS
Status: DISCONTINUED | OUTPATIENT
Start: 2021-12-23 | End: 2021-12-23 | Stop reason: HOSPADM

## 2021-12-23 RX ORDER — GABAPENTIN 100 MG/1
300 CAPSULE ORAL 3 TIMES DAILY
Status: DISCONTINUED | OUTPATIENT
Start: 2021-12-23 | End: 2021-12-27 | Stop reason: HOSPADM

## 2021-12-23 RX ORDER — OXYCODONE HYDROCHLORIDE 5 MG/1
5 TABLET ORAL
Status: DISCONTINUED | OUTPATIENT
Start: 2021-12-23 | End: 2021-12-23 | Stop reason: HOSPADM

## 2021-12-23 RX ORDER — DEXAMETHASONE SODIUM PHOSPHATE 4 MG/ML
INJECTION, SOLUTION INTRA-ARTICULAR; INTRALESIONAL; INTRAMUSCULAR; INTRAVENOUS; SOFT TISSUE AS NEEDED
Status: DISCONTINUED | OUTPATIENT
Start: 2021-12-23 | End: 2021-12-23 | Stop reason: HOSPADM

## 2021-12-23 RX ORDER — NALOXONE HYDROCHLORIDE 0.4 MG/ML
0.4 INJECTION, SOLUTION INTRAMUSCULAR; INTRAVENOUS; SUBCUTANEOUS AS NEEDED
Status: DISCONTINUED | OUTPATIENT
Start: 2021-12-23 | End: 2021-12-27 | Stop reason: HOSPADM

## 2021-12-23 RX ORDER — FERROUS SULFATE 324(65)MG
324 TABLET, DELAYED RELEASE (ENTERIC COATED) ORAL DAILY
COMMUNITY

## 2021-12-23 RX ORDER — ROPIVACAINE HYDROCHLORIDE 5 MG/ML
INJECTION, SOLUTION EPIDURAL; INFILTRATION; PERINEURAL
Status: COMPLETED | OUTPATIENT
Start: 2021-12-23 | End: 2021-12-23

## 2021-12-23 RX ORDER — OXYCODONE HYDROCHLORIDE 5 MG/1
5 TABLET ORAL
Status: DISCONTINUED | OUTPATIENT
Start: 2021-12-23 | End: 2021-12-27 | Stop reason: HOSPADM

## 2021-12-23 RX ORDER — MIDAZOLAM HYDROCHLORIDE 1 MG/ML
2 INJECTION, SOLUTION INTRAMUSCULAR; INTRAVENOUS ONCE
Status: DISCONTINUED | OUTPATIENT
Start: 2021-12-23 | End: 2021-12-23 | Stop reason: HOSPADM

## 2021-12-23 RX ORDER — SODIUM CHLORIDE 0.9 % (FLUSH) 0.9 %
5-40 SYRINGE (ML) INJECTION EVERY 8 HOURS
Status: DISCONTINUED | OUTPATIENT
Start: 2021-12-23 | End: 2021-12-23 | Stop reason: HOSPADM

## 2021-12-23 RX ORDER — LIDOCAINE HYDROCHLORIDE ANHYDROUS AND DEXTROSE MONOHYDRATE .8; 5 G/100ML; G/100ML
INJECTION, SOLUTION INTRAVENOUS
Status: DISCONTINUED | OUTPATIENT
Start: 2021-12-23 | End: 2021-12-23 | Stop reason: HOSPADM

## 2021-12-23 RX ORDER — SODIUM CHLORIDE 0.9 % (FLUSH) 0.9 %
5-40 SYRINGE (ML) INJECTION AS NEEDED
Status: DISCONTINUED | OUTPATIENT
Start: 2021-12-23 | End: 2021-12-23 | Stop reason: HOSPADM

## 2021-12-23 RX ORDER — FENTANYL CITRATE 50 UG/ML
INJECTION, SOLUTION INTRAMUSCULAR; INTRAVENOUS AS NEEDED
Status: DISCONTINUED | OUTPATIENT
Start: 2021-12-23 | End: 2021-12-23 | Stop reason: HOSPADM

## 2021-12-23 RX ORDER — NALOXONE HYDROCHLORIDE 0.4 MG/ML
0.1 INJECTION, SOLUTION INTRAMUSCULAR; INTRAVENOUS; SUBCUTANEOUS AS NEEDED
Status: DISCONTINUED | OUTPATIENT
Start: 2021-12-23 | End: 2021-12-23 | Stop reason: HOSPADM

## 2021-12-23 RX ORDER — LEVOTHYROXINE SODIUM 50 UG/1
100 TABLET ORAL
Status: DISCONTINUED | OUTPATIENT
Start: 2021-12-24 | End: 2021-12-27 | Stop reason: HOSPADM

## 2021-12-23 RX ORDER — METRONIDAZOLE 500 MG/100ML
500 INJECTION, SOLUTION INTRAVENOUS ONCE
Status: COMPLETED | OUTPATIENT
Start: 2021-12-23 | End: 2021-12-23

## 2021-12-23 RX ORDER — KETAMINE HYDROCHLORIDE 50 MG/ML
INJECTION, SOLUTION INTRAMUSCULAR; INTRAVENOUS AS NEEDED
Status: DISCONTINUED | OUTPATIENT
Start: 2021-12-23 | End: 2021-12-23 | Stop reason: HOSPADM

## 2021-12-23 RX ORDER — LIDOCAINE HYDROCHLORIDE 20 MG/ML
INJECTION, SOLUTION EPIDURAL; INFILTRATION; INTRACAUDAL; PERINEURAL AS NEEDED
Status: DISCONTINUED | OUTPATIENT
Start: 2021-12-23 | End: 2021-12-23 | Stop reason: HOSPADM

## 2021-12-23 RX ORDER — ONDANSETRON 2 MG/ML
INJECTION INTRAMUSCULAR; INTRAVENOUS AS NEEDED
Status: DISCONTINUED | OUTPATIENT
Start: 2021-12-23 | End: 2021-12-23 | Stop reason: HOSPADM

## 2021-12-23 RX ORDER — NEOSTIGMINE METHYLSULFATE 1 MG/ML
INJECTION, SOLUTION INTRAVENOUS AS NEEDED
Status: DISCONTINUED | OUTPATIENT
Start: 2021-12-23 | End: 2021-12-23 | Stop reason: HOSPADM

## 2021-12-23 RX ORDER — LIDOCAINE HYDROCHLORIDE 10 MG/ML
0.1 INJECTION INFILTRATION; PERINEURAL AS NEEDED
Status: DISCONTINUED | OUTPATIENT
Start: 2021-12-23 | End: 2021-12-23 | Stop reason: HOSPADM

## 2021-12-23 RX ORDER — ROCURONIUM BROMIDE 10 MG/ML
INJECTION, SOLUTION INTRAVENOUS AS NEEDED
Status: DISCONTINUED | OUTPATIENT
Start: 2021-12-23 | End: 2021-12-23 | Stop reason: HOSPADM

## 2021-12-23 RX ORDER — DEXAMETHASONE SODIUM PHOSPHATE 4 MG/ML
INJECTION, SOLUTION INTRA-ARTICULAR; INTRALESIONAL; INTRAMUSCULAR; INTRAVENOUS; SOFT TISSUE
Status: COMPLETED | OUTPATIENT
Start: 2021-12-23 | End: 2021-12-23

## 2021-12-23 RX ORDER — ACETAMINOPHEN 500 MG
1000 TABLET ORAL EVERY 6 HOURS
Status: DISCONTINUED | OUTPATIENT
Start: 2021-12-23 | End: 2021-12-27 | Stop reason: HOSPADM

## 2021-12-23 RX ORDER — HYDROMORPHONE HYDROCHLORIDE 2 MG/ML
INJECTION, SOLUTION INTRAMUSCULAR; INTRAVENOUS; SUBCUTANEOUS AS NEEDED
Status: DISCONTINUED | OUTPATIENT
Start: 2021-12-23 | End: 2021-12-23 | Stop reason: HOSPADM

## 2021-12-23 RX ORDER — FENTANYL CITRATE 50 UG/ML
100 INJECTION, SOLUTION INTRAMUSCULAR; INTRAVENOUS AS NEEDED
Status: DISCONTINUED | OUTPATIENT
Start: 2021-12-23 | End: 2021-12-23 | Stop reason: HOSPADM

## 2021-12-23 RX ORDER — SODIUM CHLORIDE, SODIUM LACTATE, POTASSIUM CHLORIDE, CALCIUM CHLORIDE 600; 310; 30; 20 MG/100ML; MG/100ML; MG/100ML; MG/100ML
75 INJECTION, SOLUTION INTRAVENOUS CONTINUOUS
Status: DISCONTINUED | OUTPATIENT
Start: 2021-12-23 | End: 2021-12-23 | Stop reason: HOSPADM

## 2021-12-23 RX ORDER — CEFAZOLIN SODIUM/WATER 2 G/20 ML
2 SYRINGE (ML) INTRAVENOUS EVERY 8 HOURS
Status: DISCONTINUED | OUTPATIENT
Start: 2021-12-23 | End: 2021-12-23 | Stop reason: HOSPADM

## 2021-12-23 RX ORDER — ACETAMINOPHEN 500 MG
1000 TABLET ORAL ONCE
Status: COMPLETED | OUTPATIENT
Start: 2021-12-23 | End: 2021-12-23

## 2021-12-23 RX ORDER — ENOXAPARIN SODIUM 100 MG/ML
40 INJECTION SUBCUTANEOUS EVERY 24 HOURS
Status: DISCONTINUED | OUTPATIENT
Start: 2021-12-24 | End: 2021-12-27 | Stop reason: HOSPADM

## 2021-12-23 RX ORDER — ONDANSETRON 4 MG/1
4 TABLET, ORALLY DISINTEGRATING ORAL
Status: DISCONTINUED | OUTPATIENT
Start: 2021-12-23 | End: 2021-12-27 | Stop reason: HOSPADM

## 2021-12-23 RX ORDER — LIDOCAINE HYDROCHLORIDE ANHYDROUS AND DEXTROSE MONOHYDRATE .8; 5 G/100ML; G/100ML
1 INJECTION, SOLUTION INTRAVENOUS CONTINUOUS
Status: ACTIVE | OUTPATIENT
Start: 2021-12-23 | End: 2021-12-24

## 2021-12-23 RX ORDER — DIPHENHYDRAMINE HYDROCHLORIDE 50 MG/ML
12.5 INJECTION, SOLUTION INTRAMUSCULAR; INTRAVENOUS
Status: DISCONTINUED | OUTPATIENT
Start: 2021-12-23 | End: 2021-12-23 | Stop reason: HOSPADM

## 2021-12-23 RX ORDER — CEFAZOLIN SODIUM/WATER 2 G/20 ML
2 SYRINGE (ML) INTRAVENOUS ONCE
Status: COMPLETED | OUTPATIENT
Start: 2021-12-23 | End: 2021-12-23

## 2021-12-23 RX ORDER — SODIUM CHLORIDE, SODIUM LACTATE, POTASSIUM CHLORIDE, CALCIUM CHLORIDE 600; 310; 30; 20 MG/100ML; MG/100ML; MG/100ML; MG/100ML
INJECTION, SOLUTION INTRAVENOUS
Status: DISCONTINUED | OUTPATIENT
Start: 2021-12-23 | End: 2021-12-23 | Stop reason: HOSPADM

## 2021-12-23 RX ORDER — METRONIDAZOLE 500 MG/100ML
500 INJECTION, SOLUTION INTRAVENOUS EVERY 8 HOURS
Status: COMPLETED | OUTPATIENT
Start: 2021-12-23 | End: 2021-12-23

## 2021-12-23 RX ORDER — DOCUSATE SODIUM 100 MG/1
100 CAPSULE, LIQUID FILLED ORAL 2 TIMES DAILY
Status: DISCONTINUED | OUTPATIENT
Start: 2021-12-23 | End: 2021-12-27 | Stop reason: HOSPADM

## 2021-12-23 RX ORDER — APREPITANT 40 MG/1
40 CAPSULE ORAL ONCE
Status: COMPLETED | OUTPATIENT
Start: 2021-12-23 | End: 2021-12-23

## 2021-12-23 RX ORDER — SODIUM CHLORIDE, SODIUM LACTATE, POTASSIUM CHLORIDE, CALCIUM CHLORIDE 600; 310; 30; 20 MG/100ML; MG/100ML; MG/100ML; MG/100ML
100 INJECTION, SOLUTION INTRAVENOUS CONTINUOUS
Status: DISCONTINUED | OUTPATIENT
Start: 2021-12-23 | End: 2021-12-23 | Stop reason: HOSPADM

## 2021-12-23 RX ORDER — EPHEDRINE SULFATE/0.9% NACL/PF 50 MG/5 ML
SYRINGE (ML) INTRAVENOUS AS NEEDED
Status: DISCONTINUED | OUTPATIENT
Start: 2021-12-23 | End: 2021-12-23 | Stop reason: HOSPADM

## 2021-12-23 RX ORDER — SODIUM CHLORIDE, SODIUM LACTATE, POTASSIUM CHLORIDE, CALCIUM CHLORIDE 600; 310; 30; 20 MG/100ML; MG/100ML; MG/100ML; MG/100ML
25 INJECTION, SOLUTION INTRAVENOUS CONTINUOUS
Status: DISPENSED | OUTPATIENT
Start: 2021-12-23 | End: 2021-12-24

## 2021-12-23 RX ORDER — PROPOFOL 10 MG/ML
INJECTION, EMULSION INTRAVENOUS AS NEEDED
Status: DISCONTINUED | OUTPATIENT
Start: 2021-12-23 | End: 2021-12-23 | Stop reason: HOSPADM

## 2021-12-23 RX ORDER — METOPROLOL TARTRATE 25 MG/1
25 TABLET, FILM COATED ORAL 2 TIMES DAILY
Status: DISCONTINUED | OUTPATIENT
Start: 2021-12-23 | End: 2021-12-27 | Stop reason: HOSPADM

## 2021-12-23 RX ORDER — HYDROMORPHONE HYDROCHLORIDE 2 MG/ML
0.5 INJECTION, SOLUTION INTRAMUSCULAR; INTRAVENOUS; SUBCUTANEOUS
Status: DISCONTINUED | OUTPATIENT
Start: 2021-12-23 | End: 2021-12-23 | Stop reason: HOSPADM

## 2021-12-23 RX ORDER — GLYCOPYRROLATE 0.2 MG/ML
INJECTION INTRAMUSCULAR; INTRAVENOUS AS NEEDED
Status: DISCONTINUED | OUTPATIENT
Start: 2021-12-23 | End: 2021-12-23 | Stop reason: HOSPADM

## 2021-12-23 RX ADMIN — Medication 4 MG: at 10:30

## 2021-12-23 RX ADMIN — LIDOCAINE HYDROCHLORIDE 1 MG/KG/HR: 8 INJECTION, SOLUTION INTRAVENOUS at 12:20

## 2021-12-23 RX ADMIN — OXYCODONE 5 MG: 5 TABLET ORAL at 23:42

## 2021-12-23 RX ADMIN — SODIUM CHLORIDE, SODIUM LACTATE, POTASSIUM CHLORIDE, AND CALCIUM CHLORIDE 100 ML/HR: 600; 310; 30; 20 INJECTION, SOLUTION INTRAVENOUS at 07:08

## 2021-12-23 RX ADMIN — DEXAMETHASONE SODIUM PHOSPHATE 10 MG: 4 INJECTION, SOLUTION INTRAMUSCULAR; INTRAVENOUS at 08:50

## 2021-12-23 RX ADMIN — HYDROMORPHONE HYDROCHLORIDE 0.5 MG: 2 INJECTION INTRAMUSCULAR; INTRAVENOUS; SUBCUTANEOUS at 10:06

## 2021-12-23 RX ADMIN — ACETAMINOPHEN 500 MG: 500 TABLET ORAL at 17:15

## 2021-12-23 RX ADMIN — ACETAMINOPHEN 1000 MG: 500 TABLET ORAL at 23:42

## 2021-12-23 RX ADMIN — ONDANSETRON 4 MG: 2 INJECTION INTRAMUSCULAR; INTRAVENOUS at 08:50

## 2021-12-23 RX ADMIN — HYDROMORPHONE HYDROCHLORIDE 0.25 MG: 2 INJECTION INTRAMUSCULAR; INTRAVENOUS; SUBCUTANEOUS at 10:17

## 2021-12-23 RX ADMIN — FENTANYL CITRATE 100 MCG: 50 INJECTION INTRAMUSCULAR; INTRAVENOUS at 08:06

## 2021-12-23 RX ADMIN — LIDOCAINE HYDROCHLORIDE 1 MG/KG/HR: 8 INJECTION, SOLUTION INTRAVENOUS at 08:23

## 2021-12-23 RX ADMIN — HYDROMORPHONE HYDROCHLORIDE 0.5 MG: 2 INJECTION INTRAMUSCULAR; INTRAVENOUS; SUBCUTANEOUS at 13:32

## 2021-12-23 RX ADMIN — LIDOCAINE HYDROCHLORIDE 100 MG: 20 INJECTION, SOLUTION EPIDURAL; INFILTRATION; INTRACAUDAL; PERINEURAL at 08:06

## 2021-12-23 RX ADMIN — METRONIDAZOLE 500 MG: 500 INJECTION, SOLUTION INTRAVENOUS at 21:34

## 2021-12-23 RX ADMIN — METRONIDAZOLE 500 MG: 500 INJECTION, SOLUTION INTRAVENOUS at 07:05

## 2021-12-23 RX ADMIN — SODIUM CHLORIDE, SODIUM LACTATE, POTASSIUM CHLORIDE, AND CALCIUM CHLORIDE: 600; 310; 30; 20 INJECTION, SOLUTION INTRAVENOUS at 08:15

## 2021-12-23 RX ADMIN — PROPOFOL 200 MG: 10 INJECTION, EMULSION INTRAVENOUS at 08:06

## 2021-12-23 RX ADMIN — GABAPENTIN 300 MG: 100 CAPSULE ORAL at 17:15

## 2021-12-23 RX ADMIN — GLYCOPYRROLATE 0.2 MG: 0.2 INJECTION, SOLUTION INTRAMUSCULAR; INTRAVENOUS at 09:47

## 2021-12-23 RX ADMIN — NALOXEGOL OXALATE 25 MG: 25 TABLET, FILM COATED ORAL at 07:04

## 2021-12-23 RX ADMIN — Medication 10 MG: at 09:14

## 2021-12-23 RX ADMIN — SODIUM CHLORIDE, SODIUM LACTATE, POTASSIUM CHLORIDE, AND CALCIUM CHLORIDE: 600; 310; 30; 20 INJECTION, SOLUTION INTRAVENOUS at 09:37

## 2021-12-23 RX ADMIN — DOCUSATE SODIUM 100 MG: 100 CAPSULE ORAL at 17:15

## 2021-12-23 RX ADMIN — ROPIVACAINE HYDROCHLORIDE 20 ML: 150 INJECTION, SOLUTION EPIDURAL; INFILTRATION; PERINEURAL at 10:23

## 2021-12-23 RX ADMIN — ACETAMINOPHEN 1000 MG: 500 TABLET ORAL at 07:04

## 2021-12-23 RX ADMIN — KETAMINE HYDROCHLORIDE 20 MG: 50 INJECTION INTRAMUSCULAR; INTRAVENOUS at 09:15

## 2021-12-23 RX ADMIN — ROCURONIUM BROMIDE 10 MG: 10 INJECTION, SOLUTION INTRAVENOUS at 09:09

## 2021-12-23 RX ADMIN — OXYCODONE 5 MG: 5 TABLET ORAL at 19:30

## 2021-12-23 RX ADMIN — METRONIDAZOLE 500 MG: 500 INJECTION, SOLUTION INTRAVENOUS at 17:14

## 2021-12-23 RX ADMIN — DEXAMETHASONE SODIUM PHOSPHATE 4 MG: 4 INJECTION, SOLUTION INTRA-ARTICULAR; INTRALESIONAL; INTRAMUSCULAR; INTRAVENOUS; SOFT TISSUE at 10:23

## 2021-12-23 RX ADMIN — GLYCOPYRROLATE 0.6 MG: 0.2 INJECTION, SOLUTION INTRAMUSCULAR; INTRAVENOUS at 10:30

## 2021-12-23 RX ADMIN — KETAMINE HYDROCHLORIDE 40 MG: 50 INJECTION INTRAMUSCULAR; INTRAVENOUS at 08:15

## 2021-12-23 RX ADMIN — ROCURONIUM BROMIDE 50 MG: 10 INJECTION, SOLUTION INTRAVENOUS at 08:06

## 2021-12-23 RX ADMIN — METOPROLOL TARTRATE 25 MG: 25 TABLET, FILM COATED ORAL at 17:17

## 2021-12-23 RX ADMIN — APREPITANT 40 MG: 40 CAPSULE ORAL at 07:04

## 2021-12-23 RX ADMIN — HYDROMORPHONE HYDROCHLORIDE 0.25 MG: 2 INJECTION INTRAMUSCULAR; INTRAVENOUS; SUBCUTANEOUS at 10:39

## 2021-12-23 RX ADMIN — DEXAMETHASONE SODIUM PHOSPHATE 4 MG: 4 INJECTION, SOLUTION INTRA-ARTICULAR; INTRALESIONAL; INTRAMUSCULAR; INTRAVENOUS; SOFT TISSUE at 10:22

## 2021-12-23 RX ADMIN — ROPIVACAINE HYDROCHLORIDE 20 ML: 5 INJECTION, SOLUTION EPIDURAL; INFILTRATION; PERINEURAL at 10:22

## 2021-12-23 RX ADMIN — HYDROMORPHONE HYDROCHLORIDE 0.5 MG: 2 INJECTION INTRAMUSCULAR; INTRAVENOUS; SUBCUTANEOUS at 11:24

## 2021-12-23 RX ADMIN — Medication 2 G: at 08:30

## 2021-12-23 RX ADMIN — GABAPENTIN 300 MG: 100 CAPSULE ORAL at 21:34

## 2021-12-23 NOTE — BRIEF OP NOTE
Brief Postoperative Note    Patient: Gretchen Hilario  YOB: 1960  MRN: [id number]    Date of Procedure: 12/23/2021     Pre-Op Diagnosis: Malignant neoplasm of colon, unspecified part of colon ([address] [C18.9]    Post-Op Diagnosis: Same as preoperative diagnosis. Procedure(s):  ERAS/ LAPAROSCOPIC RIGHT HEMICOLECTOMY    Surgeon(s):   Eliane Peterson MD    Surgical Assistant: Surg Asst-1: Reyes Arms    Anesthesia: General     Estimated Blood Loss (mL): Minimal    Complications: None    Specimens:   ID Type Source Tests Collected by Time Destination   1 : right hemicolectomy Fresh Abdomen  Eliane Peterson MD 12/23/2021 3938 Pathology   2 : root of mesentery Preservative Abdomen  Eliane Peterson MD 12/23/2021 5184 Pathology        Implants: * No implants in log *    Drains: * No LDAs found *    Findings: right colon mass, no peritoneal disease,     Electronically Signed by Sandro Carranza MD on 12/23/2021 at 10:50 AM

## 2021-12-23 NOTE — ANESTHESIA PREPROCEDURE EVALUATION
Relevant Problems   PERSONAL HX & FAMILY HX OF CANCER   (+) Malignant neoplasm of hepatic flexure (HCC)       Anesthetic History   No history of anesthetic complications            Review of Systems / Medical History  Patient summary reviewed and pertinent labs reviewed    Pulmonary  Within defined limits                 Neuro/Psych   Within defined limits           Cardiovascular    Hypertension: well controlled              Exercise tolerance: >4 METS     GI/Hepatic/Renal     GERD: well controlled           Endo/Other      Hypothyroidism: well controlled  Obesity, arthritis and cancer (colon)     Other Findings              Physical Exam    Airway  Mallampati: I  TM Distance: 4 - 6 cm  Neck ROM: normal range of motion   Mouth opening: Normal     Cardiovascular  Regular rate and rhythm,  S1 and S2 normal,  no murmur, click, rub, or gallop  Rhythm: regular  Rate: normal         Dental    Dentition: Edentulous     Pulmonary  Breath sounds clear to auscultation               Abdominal  GI exam deferred       Other Findings            Anesthetic Plan    ASA: 2  Anesthesia type: general  ETT  ERAS        Induction: Intravenous  Anesthetic plan and risks discussed with: Patient and Family      ERAS - emend preop + intraop lidocaine drip and ketamine. Discussed possible abdominal wall blocks in setting of conversion to open procedure. Patient amenable and all questions/concerns addressed.

## 2021-12-23 NOTE — PERIOP NOTES
Lidocaine drip pump settings confirmed at [facility] body weight: 60.5 kg (133 lb 4.3 oz). Infusing at 7.6 ml/hour.

## 2021-12-23 NOTE — PROGRESS NOTES
's pre-procedure visit and prayer with patient as requested.     Ty Lezama MDiv, BS  Board Certified

## 2021-12-23 NOTE — ANESTHESIA PROCEDURE NOTES
Peripheral Block    Start time: 12/23/2021 10:20 AM  End time: 12/23/2021 10:22 AM  Performed by: Lynn Stone MD  Authorized by: Lynn Stone MD       Pre-procedure:    Indications: at surgeon's request and post-op pain management    Preanesthetic Checklist: patient identified, risks and benefits discussed, site marked, timeout performed, anesthesia consent given and patient being monitored    Timeout Time: 10:20 EST          Block Type:   Block Type:  TAP  Laterality:  Left  Monitoring:  Standard ASA monitoring, continuous pulse ox, frequent vital sign checks, heart rate and oxygen  Injection Technique:  Single shot  Procedures: ultrasound guided    Patient Position: supine  Prep: chlorhexidine    Location:  Abdominal  Needle Gauge:  20 G  Needle Localization:  Ultrasound guidance  Medication Injected:  Ropivacaine (PF) (NAROPIN)(0.5%) 5 mg/mL injection, 20 mL  dexamethasone (DECADRON) 4 mg/mL injection, 4 mg  Med Admin Time: 12/23/2021 10:22 AM    Assessment:  Number of attempts:  1  Injection Assessment:  Incremental injection every 5 mL, negative aspiration for blood, no intravascular symptoms and ultrasound image on chart  Patient tolerance:  Patient tolerated the procedure well with no immediate complications

## 2021-12-23 NOTE — PERIOP NOTES
TRANSFER - OUT REPORT:    Verbal report given to SAV Johnson on JORGETORREYGutierrez Cesar  being transferred to  for routine post - op       Report consisted of patients Situation, Background, Assessment and   Recommendations(SBAR). Information from the following report(s) OR Summary, Procedure Summary, Intake/Output and MAR was reviewed with the receiving nurse. Lines:   Peripheral IV 12/23/21 Left;Posterior Wrist (Active)   Site Assessment Clean, dry, & intact 12/23/21 1130   Phlebitis Assessment 0 12/23/21 1130   Infiltration Assessment 0 12/23/21 1130   Dressing Status Clean, dry, & intact 12/23/21 1130   Dressing Type Tape;Transparent 12/23/21 1130   Hub Color/Line Status Patent 12/23/21 1130   Alcohol Cap Used No 12/23/21 0632       Peripheral IV 12/23/21 Left Hand (Active)   Site Assessment Clean, dry, & intact 12/23/21 1130   Phlebitis Assessment 0 12/23/21 1130   Infiltration Assessment 0 12/23/21 1130   Dressing Status Clean, dry, & intact 12/23/21 1130   Dressing Type Tape;Transparent 12/23/21 1130   Hub Color/Line Status Patent 12/23/21 1130        Opportunity for questions and clarification was provided. Patient transported with:   Tech    VTE prophylaxis orders have been written for KATHY Cesar. Patient and family given floor number and nurses name.

## 2021-12-23 NOTE — ANESTHESIA PROCEDURE NOTES
Peripheral Block    Start time: 12/23/2021 10:22 AM  End time: 12/23/2021 10:24 AM  Performed by: Ollie Villareal MD  Authorized by: Ollie Villareal MD       Pre-procedure:    Indications: at surgeon's request and post-op pain management    Preanesthetic Checklist: patient identified, risks and benefits discussed, site marked, timeout performed, anesthesia consent given and patient being monitored    Timeout Time: 10:22 EST          Block Type:   Block Type:  TAP  Laterality:  Right  Monitoring:  Standard ASA monitoring, continuous pulse ox, frequent vital sign checks, heart rate and oxygen  Injection Technique:  Single shot  Procedures: ultrasound guided    Patient Position: supine  Prep: chlorhexidine    Location:  Abdominal  Needle Gauge:  20 G  Needle Localization:  Ultrasound guidance  Medication Injected:  Ropivacaine (PF) (NAROPIN)(0.5%) 5 mg/mL injection, 20 mL  dexamethasone (DECADRON) 4 mg/mL injection, 4 mg  Med Admin Time: 12/23/2021 10:23 AM    Assessment:  Number of attempts:  1  Injection Assessment:  Incremental injection every 5 mL, negative aspiration for blood, no intravascular symptoms and ultrasound image on chart  Patient tolerance:  Patient tolerated the procedure well with no immediate complications

## 2021-12-23 NOTE — OP NOTES
[facility]  OPERATIVE REPORT    Name:  Shirley Pennington  MR#:  [id number]  :  1960  ACCOUNT #:  [de-identified]  DATE OF SERVICE:  2021    PREOPERATIVE DIAGNOSIS:  Right colon cancer. POSTOPERATIVE DIAGNOSIS:  Right colon cancer. PROCEDURE PERFORMED:  Laparoscopic right hemicolectomy. SURGEON:  Andrey Heath MD    ANESTHESIA: general    COMPLICATIONS:  none    SPECIMENS REMOVED: as above    IMPLANTS:  none    ESTIMATED BLOOD LOSS:  Minimal.    INDICATION:  This is a 60-year-old female who presented with right colon cancer confirmed by colonoscopy. No obvious metastatic disease although some prominent mesenteric lymph nodes. The patient was offered urgent surgery. She understood risks and benefits and agreed to proceed. FINDINGS:  Her cancer is located in the right colon, appeared to be contained, no evidence of peritoneal disease. She does have some slightly enlarged mesenteric lymph node stained by the blue dye. PROCEDURE:  After informed consent was obtained, the patient was brought into the operating room and left in supine position. General anesthesia was administered. The patient's abdomen was prepped and draped in a routine fashion. A supraumbilical incision was made. Johanne cannula was inserted. Pneumoperitoneum created. Three 5-mm trocars were placed in the right lower quadrant, right upper quadrant and left upper quadrant. Then, the patient was placed in steep Trendelenburg position. The pelvis was fully exposed. I first mobilized the terminal ileum off the pelvis. She does have a quite a bit of adhesions. She had a previous appendectomy. The terminal ileum was fully mobilized out of the pelvis and then the dissection continued along the pericolic gutter to mobilize the hepatic flexure. As noted, she has a large renal cyst and this was not disrupted. Then, the transverse colon was mobilized off the second portion of duodenum.   The right colon was completely flipped medially and then the Johanne cannula site was switched to a minilaparotomy incision. Wound protector was placed. The terminal ileum, the right colon and transverse colon was able to be eviscerated completely. The mass was clearly identified in the right colon and then the terminal ileum was divided with the YUSUF stapler, so was the transverse colon. Then, the mesentery was dissected all the way to the root of the ileocolic arteries. This was then double-tied with 0 silk. The additional artery right at the root of mesentery was removed, which was stained blue. Then, the primary anastomosis was performed between the terminal ileum and the transverse colon. This was done with a YUSUF and TA stapler in a routine fashion. Staple line was hemostatic and anastomosis was wide open. Then, the anastomosis returned to the peritoneal cavity. The surgical field inspected. No evidence of bleeding or bowel injury. Then, the minilaparotomy incision was closed on the fascia with 0 looped PDS in a running fashion. Skin was closed with staples. The patient tolerated the procedure well, transferred to recovery room in stable condition. All the instrument count and lap count were correct.   Estimated blood loss was minimal.      Yanci Schmidt MD      [id number]  D:  12/23/2021 11:01  T:  12/23/2021 14:35  JOB #:  [id number]

## 2021-12-23 NOTE — PROGRESS NOTES
TRANSFER - IN REPORT:    Verbal report received from [facility] on KATHY Cesar  being received from PACU (unit) for routine post - op      Report consisted of patients Situation, Background, Assessment and   Recommendations(SBAR). Information from the following report(s) SBAR, Kardex, OR Summary, Intake/Output and MAR was reviewed with the receiving nurse. Opportunity for questions and clarification was provided. Assessment completed upon patients arrival to unit and care assumed.

## 2021-12-23 NOTE — ANESTHESIA POSTPROCEDURE EVALUATION
Procedure(s):  ERAS/ LAPAROSCOPIC ASSISTED RIGHT HEMICOLECTOMY. general    Anesthesia Post Evaluation      Multimodal analgesia: multimodal analgesia used between 6 hours prior to anesthesia start to PACU discharge  Patient location during evaluation: PACU  Patient participation: complete - patient participated  Level of consciousness: awake and alert  Pain management: adequate  Airway patency: patent  Anesthetic complications: no  Cardiovascular status: acceptable  Respiratory status: acceptable  Hydration status: acceptable  Post anesthesia nausea and vomiting:  controlled  Final Post Anesthesia Temperature Assessment:  Normothermia (36.0-37.5 degrees C)      INITIAL Post-op Vital signs:   Vitals Value Taken Time   /73 12/23/21 1454   Temp 36.4 °C (97.6 °F) 12/23/21 1130   Pulse 45 12/23/21 1506   Resp 14 12/23/21 1500   SpO2 98 % 12/23/21 1506   Vitals shown include unvalidated device data.

## 2021-12-24 LAB
ANION GAP SERPL CALC-SCNC: 6 MMOL/L (ref 7–16)
BUN SERPL-MCNC: 11 MG/DL (ref 8–23)
CALCIUM SERPL-MCNC: 9 MG/DL (ref 8.3–10.4)
CHLORIDE SERPL-SCNC: 107 MMOL/L (ref 98–107)
CO2 SERPL-SCNC: 25 MMOL/L (ref 21–32)
CREAT SERPL-MCNC: 0.98 MG/DL (ref 0.6–1)
ERYTHROCYTE [DISTWIDTH] IN BLOOD BY AUTOMATED COUNT: 14.1 % (ref 11.9–14.6)
GLUCOSE SERPL-MCNC: 129 MG/DL (ref 65–100)
HCT VFR BLD AUTO: 34.3 % (ref 35.8–46.3)
HGB BLD-MCNC: 10.4 G/DL (ref 11.7–15.4)
MAGNESIUM SERPL-MCNC: 2 MG/DL (ref 1.8–2.4)
MCH RBC QN AUTO: 26.9 PG (ref 26.1–32.9)
MCHC RBC AUTO-ENTMCNC: 30.3 G/DL (ref 31.4–35)
MCV RBC AUTO: 88.9 FL (ref 79.6–97.8)
NRBC # BLD: 0 K/UL (ref 0–0.2)
PHOSPHATE SERPL-MCNC: 2.7 MG/DL (ref 2.3–3.7)
PLATELET # BLD AUTO: 209 K/UL (ref 150–450)
PMV BLD AUTO: 10.8 FL (ref 9.4–12.3)
POTASSIUM SERPL-SCNC: 4.3 MMOL/L (ref 3.5–5.1)
RBC # BLD AUTO: 3.86 M/UL (ref 4.05–5.2)
SODIUM SERPL-SCNC: 138 MMOL/L (ref 136–145)
WBC # BLD AUTO: 13.5 K/UL (ref 4.3–11.1)

## 2021-12-24 PROCEDURE — 74011250637 HC RX REV CODE- 250/637: Performed by: SURGERY

## 2021-12-24 PROCEDURE — 36415 COLL VENOUS BLD VENIPUNCTURE: CPT

## 2021-12-24 PROCEDURE — 83735 ASSAY OF MAGNESIUM: CPT

## 2021-12-24 PROCEDURE — C9113 INJ PANTOPRAZOLE SODIUM, VIA: HCPCS | Performed by: NURSE PRACTITIONER

## 2021-12-24 PROCEDURE — 85027 COMPLETE CBC AUTOMATED: CPT

## 2021-12-24 PROCEDURE — 74011250636 HC RX REV CODE- 250/636: Performed by: NURSE PRACTITIONER

## 2021-12-24 PROCEDURE — 80048 BASIC METABOLIC PNL TOTAL CA: CPT

## 2021-12-24 PROCEDURE — 74011250636 HC RX REV CODE- 250/636: Performed by: SURGERY

## 2021-12-24 PROCEDURE — 2709999900 HC NON-CHARGEABLE SUPPLY

## 2021-12-24 PROCEDURE — 84100 ASSAY OF PHOSPHORUS: CPT

## 2021-12-24 PROCEDURE — 65270000029 HC RM PRIVATE

## 2021-12-24 RX ADMIN — ENOXAPARIN SODIUM 40 MG: 100 INJECTION SUBCUTANEOUS at 08:43

## 2021-12-24 RX ADMIN — ACETAMINOPHEN 1000 MG: 500 TABLET ORAL at 23:39

## 2021-12-24 RX ADMIN — SODIUM CHLORIDE, SODIUM LACTATE, POTASSIUM CHLORIDE, AND CALCIUM CHLORIDE 25 ML/HR: 600; 310; 30; 20 INJECTION, SOLUTION INTRAVENOUS at 05:50

## 2021-12-24 RX ADMIN — OXYCODONE 5 MG: 5 TABLET ORAL at 19:30

## 2021-12-24 RX ADMIN — DOCUSATE SODIUM 100 MG: 100 CAPSULE ORAL at 17:24

## 2021-12-24 RX ADMIN — NALOXEGOL OXALATE 25 MG: 25 TABLET, FILM COATED ORAL at 05:49

## 2021-12-24 RX ADMIN — DOCUSATE SODIUM 100 MG: 100 CAPSULE ORAL at 08:43

## 2021-12-24 RX ADMIN — LEVOTHYROXINE SODIUM 100 MCG: 0.05 TABLET ORAL at 05:49

## 2021-12-24 RX ADMIN — ACETAMINOPHEN 1000 MG: 500 TABLET ORAL at 12:09

## 2021-12-24 RX ADMIN — ACETAMINOPHEN 1000 MG: 500 TABLET ORAL at 17:25

## 2021-12-24 RX ADMIN — GABAPENTIN 300 MG: 100 CAPSULE ORAL at 22:01

## 2021-12-24 RX ADMIN — GABAPENTIN 300 MG: 100 CAPSULE ORAL at 08:43

## 2021-12-24 RX ADMIN — GABAPENTIN 300 MG: 100 CAPSULE ORAL at 16:05

## 2021-12-24 RX ADMIN — METOPROLOL TARTRATE 25 MG: 25 TABLET, FILM COATED ORAL at 08:43

## 2021-12-24 RX ADMIN — METOPROLOL TARTRATE 25 MG: 25 TABLET, FILM COATED ORAL at 17:24

## 2021-12-24 RX ADMIN — ACETAMINOPHEN 1000 MG: 500 TABLET ORAL at 05:49

## 2021-12-24 RX ADMIN — SODIUM CHLORIDE 40 MG: 9 INJECTION INTRAMUSCULAR; INTRAVENOUS; SUBCUTANEOUS at 14:28

## 2021-12-24 NOTE — PROGRESS NOTES
ERAS End of Shift    1 Day Post-Op     Lovett: No    Bowel Movement: No    Bowel Sounds: normal    DIET ADULT  DIET ADULT ORAL NUTRITION SUPPLEMENT     Tolerating Diet?: Yes    Ensure Surgery Immunonutrition Shakes - 2 per day: Yes    Ambulated 3 times. Up to chair for 3 meals.     Lidocaine: No    PRN Pain Medications Used?: No    IS Used: Yes    Ideal body weight: 60.5 kg (133 lb 4.3 oz)     Signed By: Randi Mejia RN     December 24, 2021

## 2021-12-24 NOTE — PROGRESS NOTES
PLAN:  Ambulate 50ft TID  OOB for all meals  Daily weight  SCD/I.S./ Protonix/Lovenox  Diet Clear Liquids  Nutritional supplements  Scheduled Celebrex  Movantik daily for 6 days  PRN narcotics  Nausea Control  DC Lovett  Will advance diet when passing flatus    ASSESSMENT:  Admit Date: 12/23/2021   1 Day Post-Op  Procedure(s):  ERAS/ LAPAROSCOPIC ASSISTED RIGHT HEMICOLECTOMY    Principal Problem:    Colon cancer ([address] (12/23/2021)         SUBJECTIVE:  Pt awake in recliner. No complaints. Tolerating Clears. No nausea or vomiting. -flatus/-BM. Lovett patent. AF, NAD. OBJECTIVE:  Constitutional: Alert oriented cooperative patient in no acute distress; appears stated age   Visit Vitals  /82   Pulse (!) 52   Temp 98.6 °F (37 °C)   Resp 18   Ht 5' 6.5\" (1.689 m)   Wt 218 lb (98.9 kg)   SpO2 94%   Breastfeeding No   BMI 34.66 kg/m²     Eyes:Sclera are clear. ENMT: no external lesions gross hearing normal; no obvious neck masses, no ear or lip lesions  CV: RRR. Normal perfusion  Resp: No JVD. Breathing is  non-labored; no audible wheezing. GI: soft and non-distended, appropriately ttp, dressing c/d/i   ; Lovett   Musculoskeletal: unremarkable with normal function. No embolic signs or cyanosis.    Neuro:  Oriented; moves all 4; no focal deficits  Psychiatric: normal affect and mood, no memory impairment      Patient Vitals for the past 24 hrs:   BP Temp Pulse Resp SpO2 Weight   12/24/21 1109 138/82 98.6 °F (37 °C) (!) 52 18 94 %    12/24/21 0714 124/70 98.7 °F (37.1 °C) (!) 50 16 94 %    12/24/21 0523      218 lb (98.9 kg)   12/24/21 0315 117/69 98.2 °F (36.8 °C) (!) 58 18 94 %    12/23/21 2326 111/69 98.2 °F (36.8 °C) (!) 50 16 95 %    12/23/21 2018 125/77 98 °F (36.7 °C) (!) 57 16 96 %    12/23/21 1617 135/83  (!) 58 18 98 %    12/23/21 1500   (!) 43 14 98 %    12/23/21 1454 123/73  (!) 48 14 99 %    12/23/21 1439 120/64  (!) 47 12 98 %    12/23/21 1425 115/64  (!) 48 12 98 %    12/23/21 1410 120/61  (!) 44 12 98 %    12/23/21 1355 117/60  (!) 43 14 98 %      Labs:    Recent Labs     12/24/21  0459   WBC 13.5*   HGB 10.4*         K 4.3      CO2 25   BUN 11   CREA 0.98   *       Melvenia Spotted, NP

## 2021-12-24 NOTE — PROGRESS NOTES
ERAS End of Shift    Day of Surgery     Lovett: Yes    Bowel Movement: No    Bowel Sounds: absent    DIET ADULT  DIET ADULT ORAL NUTRITION SUPPLEMENT     Tolerating Diet?: Yes    Ensure Surgery Immunonutrion Shakes - 2 per day? Yes, 1 today    Ambulated 0 times. Up to chair for evening meals.     Lidocaine: Yes    PRN Pain Medications Used?: Yes    IS Used: Yes    Ideal body weight: 60.5 kg (133 lb 4.3 oz)     Signed By: Cee Velarde RN     December 23, 2021

## 2021-12-24 NOTE — PROGRESS NOTES
Problem: Falls - Risk of  Goal: *Absence of Falls  Description: Document Andi Paula Fall Risk and appropriate interventions in the flowsheet.   Outcome: Progressing Towards Goal  Note: Fall Risk Interventions:  Mobility Interventions: Communicate number of staff needed for ambulation/transfer,Patient to call before getting OOB         Medication Interventions: Patient to call before getting OOB,Teach patient to arise slowly    Elimination Interventions: Patient to call for help with toileting needs,Call light in reach

## 2021-12-24 NOTE — PROGRESS NOTES
ERAS End of Shift    1 Day Post-Op     Lovett: Yes    Bowel Movement: No    Bowel Sounds: hypoactive    DIET ADULT  DIET ADULT ORAL NUTRITION SUPPLEMENT     Tolerating Diet?: Yes    Ensure Surgery Immunonutrion Shakes - 2 per day? NA    Ambulated 0 times. Up to chair for NA meals.     Lidocaine: Yes    PRN Pain Medications Used?: Yes    IS Used: Yes    Ideal body weight: 60.5 kg (133 lb 4.3 oz)     Signed By: Dhruv Diaz RN     December 24, 2021

## 2021-12-25 PROCEDURE — 74011000250 HC RX REV CODE- 250: Performed by: NURSE PRACTITIONER

## 2021-12-25 PROCEDURE — C9113 INJ PANTOPRAZOLE SODIUM, VIA: HCPCS | Performed by: NURSE PRACTITIONER

## 2021-12-25 PROCEDURE — 74011250637 HC RX REV CODE- 250/637: Performed by: SURGERY

## 2021-12-25 PROCEDURE — 65270000029 HC RM PRIVATE

## 2021-12-25 PROCEDURE — 74011250636 HC RX REV CODE- 250/636: Performed by: NURSE PRACTITIONER

## 2021-12-25 PROCEDURE — 74011250636 HC RX REV CODE- 250/636: Performed by: SURGERY

## 2021-12-25 RX ADMIN — ENOXAPARIN SODIUM 40 MG: 100 INJECTION SUBCUTANEOUS at 08:58

## 2021-12-25 RX ADMIN — ACETAMINOPHEN 1000 MG: 500 TABLET ORAL at 17:26

## 2021-12-25 RX ADMIN — ACETAMINOPHEN 1000 MG: 500 TABLET ORAL at 05:52

## 2021-12-25 RX ADMIN — SODIUM CHLORIDE 40 MG: 9 INJECTION INTRAMUSCULAR; INTRAVENOUS; SUBCUTANEOUS at 08:58

## 2021-12-25 RX ADMIN — NALOXEGOL OXALATE 25 MG: 25 TABLET, FILM COATED ORAL at 05:52

## 2021-12-25 RX ADMIN — LEVOTHYROXINE SODIUM 100 MCG: 0.05 TABLET ORAL at 05:52

## 2021-12-25 RX ADMIN — DOCUSATE SODIUM 100 MG: 100 CAPSULE ORAL at 08:58

## 2021-12-25 RX ADMIN — GABAPENTIN 300 MG: 100 CAPSULE ORAL at 21:39

## 2021-12-25 RX ADMIN — GABAPENTIN 300 MG: 100 CAPSULE ORAL at 08:58

## 2021-12-25 RX ADMIN — ACETAMINOPHEN 1000 MG: 500 TABLET ORAL at 12:15

## 2021-12-25 RX ADMIN — ACETAMINOPHEN 1000 MG: 500 TABLET ORAL at 23:51

## 2021-12-25 RX ADMIN — GABAPENTIN 300 MG: 100 CAPSULE ORAL at 17:25

## 2021-12-25 NOTE — PROGRESS NOTES
ERAS End of Shift    2 Days Post-Op     Lovett: No    Bowel Movement: Yes x 5  Pt with continued bloody BM's. Yecenia Cruz NP notified. Bowel Sounds: normal    DIET ADULT ORAL NUTRITION SUPPLEMENT  DIET ADULT     Tolerating Diet?: Yes    Ambulated 2 times. Up to chair for 3 meals.     Lidocaine: No    PRN Pain Medications Used?: No    IS Used: Yes    Ideal body weight: 60.5 kg (133 lb 4.3 oz)     Signed By: Tang Luo RN     December 25, 2021

## 2021-12-25 NOTE — PROGRESS NOTES
PLAN:  Ambulate 50ft TID  OOB for all meals  Daily weight  SCD/I.S./ Protonix/Lovenox  Full Liquids  Nutritional supplements  Scheduled Celebrex  Movantik daily for 6 days  PRN narcotics  Nausea Control  Bony Green 12/24/21  Possibly home tomorrow      ASSESSMENT:  Admit Date: 12/23/2021   2 Day Post-Op  Procedure(s):  ERAS/ LAPAROSCOPIC ASSISTED RIGHT HEMICOLECTOMY    Principal Problem:    Colon cancer ([address] (12/23/2021)         SUBJECTIVE:  Pt awake in bed. Tolerating Clears. No nausea or vomiting. Reports +flatus and +BM x 4 with visible blood in stool. AF, NAD. Hgb 13.5. OBJECTIVE:  Constitutional: Alert oriented cooperative patient in no acute distress; appears stated age   Visit Vitals  /71   Pulse (!) 57   Temp 98.8 °F (37.1 °C)   Resp 18   Ht 5' 6.5\" (1.689 m)   Wt 215 lb 9.6 oz (97.8 kg)   SpO2 97%   Breastfeeding No   BMI 34.28 kg/m²     Eyes:Sclera are clear. ENMT: no external lesions gross hearing normal; no obvious neck masses, no ear or lip lesions  CV: RRR. Normal perfusion  Resp: No JVD. Breathing is  non-labored; no audible wheezing. GI: soft and non-distended, appropriately ttp, dressing removed Staples c/d/i   Musculoskeletal: unremarkable with normal function. No embolic signs or cyanosis.    Neuro:  Oriented; moves all 4; no focal deficits  Psychiatric: normal affect and mood, no memory impairment      Patient Vitals for the past 24 hrs:   BP Temp Pulse Resp SpO2 Weight   12/25/21 1140 117/71 98.8 °F (37.1 °C) (!) 57 18 97 %    12/25/21 0722 119/75 98.8 °F (37.1 °C) (!) 53 20 98 %    12/25/21 0634      215 lb 9.6 oz (97.8 kg)   12/25/21 0320 102/68 98.1 °F (36.7 °C) (!) 51 18 97 %    12/24/21 2302 100/69 98.1 °F (36.7 °C) (!) 54 18 95 %    12/24/21 1906 119/74 98.4 °F (36.9 °C) (!) 54 18 97 %    12/24/21 1729 132/76  60        Labs:    Recent Labs     12/24/21  0459   WBC 13.5*   HGB 10.4*         K 4.3      CO2 25   BUN 11   CREA 0.98   *       Andreea Balderrama, NP

## 2021-12-25 NOTE — PROGRESS NOTES
Nurse alerted by patient of large amount of watery bloody stool sudden onset. Denies abd pain, n/v, dizziness. New findings reported to Kathy vEans NP, No new orders received; instructed to continue to monitor patient status.

## 2021-12-25 NOTE — PROGRESS NOTES
ERAS End of Shift    2 Days Post-Op     Lovett: No    Bowel Movement: Yes-Patient had 3 episodes of bloody watery stools- Frommell NP made aware. Bowel Sounds: normal    DIET ADULT  DIET ADULT ORAL NUTRITION SUPPLEMENT     Tolerating Diet?: Yes    Ensure Surgery Immunonutrion Shakes - 2 per day? Yes    Ambulated na times. Up to chair for na meals.     Lidocaine: No    PRN Pain Medications Used?: Yes    IS Used: Yes    Ideal body weight: 60.5 kg (133 lb 4.3 oz)     Signed By: Eliott Favre, RN     December 25, 2021

## 2021-12-25 NOTE — PROGRESS NOTES
Problem: Falls - Risk of  Goal: *Absence of Falls  Description: Document Jake Field Fall Risk and appropriate interventions in the flowsheet.   Outcome: Progressing Towards Goal  Note: Fall Risk Interventions:  Mobility Interventions: Communicate number of staff needed for ambulation/transfer,Patient to call before getting OOB         Medication Interventions: Patient to call before getting OOB,Teach patient to arise slowly    Elimination Interventions: Call light in reach              Problem: Patient Education: Go to Patient Education Activity  Goal: Patient/Family Education  Outcome: Progressing Towards Goal     Problem: ERAS-Colorectal Surgery:Day of Surgery  Goal: Off Pathway (Use only if patient is Off Pathway)  Outcome: Progressing Towards Goal  Goal: Activity/Safety  Outcome: Progressing Towards Goal  Goal: Consults  Outcome: Progressing Towards Goal  Goal: Nutrition/Diet  Outcome: Progressing Towards Goal  Goal: Medications  Outcome: Progressing Towards Goal  Goal: Respiratory  Outcome: Progressing Towards Goal  Goal: Treatments/Interventions/Procedures  Outcome: Progressing Towards Goal  Goal: Psychosocial  Outcome: Progressing Towards Goal  Goal: *No signs and symptoms of infection or wound complications  Outcome: Progressing Towards Goal  Goal: *Optimal pain control at patient's stated goal  Outcome: Progressing Towards Goal  Goal: *Hemodynamically stable  Outcome: Progressing Towards Goal  Goal: *Tolerating diet  Outcome: Progressing Towards Goal  Goal: *Demonstrates progressive activity  Outcome: Progressing Towards Goal

## 2021-12-25 NOTE — PROGRESS NOTES
Patient reports a 4th episode of bloody watery stools. Nurse assessed stool. +clots noted. Hgb this am 10.4. Saima Arcos NP made aware of findings. No orders received at this time. All needs currently met.

## 2021-12-26 LAB
HCT VFR BLD AUTO: 22.1 % (ref 35.8–46.3)
HCT VFR BLD AUTO: 23.9 % (ref 35.8–46.3)
HCT VFR BLD AUTO: 24 % (ref 35.8–46.3)
HGB BLD-MCNC: 6.8 G/DL (ref 11.7–15.4)
HGB BLD-MCNC: 7.3 G/DL (ref 11.7–15.4)
HGB BLD-MCNC: 7.4 G/DL (ref 11.7–15.4)

## 2021-12-26 PROCEDURE — 74011250636 HC RX REV CODE- 250/636: Performed by: NURSE PRACTITIONER

## 2021-12-26 PROCEDURE — 74011250637 HC RX REV CODE- 250/637: Performed by: SURGERY

## 2021-12-26 PROCEDURE — C9113 INJ PANTOPRAZOLE SODIUM, VIA: HCPCS | Performed by: NURSE PRACTITIONER

## 2021-12-26 PROCEDURE — 65270000029 HC RM PRIVATE

## 2021-12-26 PROCEDURE — 85014 HEMATOCRIT: CPT

## 2021-12-26 PROCEDURE — 36415 COLL VENOUS BLD VENIPUNCTURE: CPT

## 2021-12-26 PROCEDURE — 74011250636 HC RX REV CODE- 250/636: Performed by: SURGERY

## 2021-12-26 RX ORDER — SODIUM CHLORIDE 9 MG/ML
250 INJECTION, SOLUTION INTRAVENOUS AS NEEDED
Status: DISCONTINUED | OUTPATIENT
Start: 2021-12-26 | End: 2021-12-27 | Stop reason: HOSPADM

## 2021-12-26 RX ADMIN — ACETAMINOPHEN 1000 MG: 500 TABLET ORAL at 06:16

## 2021-12-26 RX ADMIN — SODIUM CHLORIDE 40 MG: 9 INJECTION INTRAMUSCULAR; INTRAVENOUS; SUBCUTANEOUS at 08:23

## 2021-12-26 RX ADMIN — NALOXEGOL OXALATE 25 MG: 25 TABLET, FILM COATED ORAL at 06:54

## 2021-12-26 RX ADMIN — ACETAMINOPHEN 1000 MG: 500 TABLET ORAL at 18:15

## 2021-12-26 RX ADMIN — DOCUSATE SODIUM 100 MG: 100 CAPSULE ORAL at 08:23

## 2021-12-26 RX ADMIN — METOPROLOL TARTRATE 25 MG: 25 TABLET, FILM COATED ORAL at 08:23

## 2021-12-26 RX ADMIN — GABAPENTIN 300 MG: 100 CAPSULE ORAL at 08:23

## 2021-12-26 RX ADMIN — ENOXAPARIN SODIUM 40 MG: 100 INJECTION SUBCUTANEOUS at 08:23

## 2021-12-26 RX ADMIN — ACETAMINOPHEN 1000 MG: 500 TABLET ORAL at 12:16

## 2021-12-26 RX ADMIN — GABAPENTIN 300 MG: 100 CAPSULE ORAL at 21:40

## 2021-12-26 RX ADMIN — GABAPENTIN 300 MG: 100 CAPSULE ORAL at 15:04

## 2021-12-26 RX ADMIN — DOCUSATE SODIUM 100 MG: 100 CAPSULE ORAL at 17:38

## 2021-12-26 RX ADMIN — LEVOTHYROXINE SODIUM 100 MCG: 0.05 TABLET ORAL at 06:16

## 2021-12-26 RX ADMIN — METOPROLOL TARTRATE 25 MG: 25 TABLET, FILM COATED ORAL at 17:38

## 2021-12-26 RX ADMIN — ACETAMINOPHEN 1000 MG: 500 TABLET ORAL at 23:51

## 2021-12-26 NOTE — PROGRESS NOTES
PLAN:  Ambulate 50ft TID  OOB for all meals  Daily weight  SCD/I.S./ Protonix/Lovenox -hold  Soft Diet  Nutritional supplements  Scheduled Celebrex  PRN narcotics  Nausea Control  Bony NICE'bg 12/24/21  Hgb q 6 hours x 3  Possibly home tomorrow      ASSESSMENT:  Admit Date: 12/23/2021   4 Day Post-Op  Procedure(s):  ERAS/ LAPAROSCOPIC ASSISTED RIGHT HEMICOLECTOMY    Principal Problem:    Colon cancer ([address] (12/23/2021)         SUBJECTIVE:  Pt sitting in chair. Tolerating Full Liquids. Wants more to eat. No nausea or vomiting. Reports +flatus and +BM x 4 yesterday with visible blood in stool. No additional BM's today. AF, NAD. Hgb 7.4. OBJECTIVE:  Constitutional: Alert oriented cooperative patient in no acute distress; appears stated age   Visit Vitals  /74   Pulse 69   Temp 98.8 °F (37.1 °C)   Resp 18   Ht 5' 6.5\" (1.689 m)   Wt 216 lb 4.8 oz (98.1 kg)   SpO2 99%   Breastfeeding No   BMI 34.39 kg/m²     Eyes: Sclera are clear. ENMT: no external lesions gross hearing normal; no obvious neck masses, no ear or lip lesions  CV: RRR. Normal perfusion  Resp: No JVD. Breathing is  non-labored; no audible wheezing. GI: soft and non-distended, appropriately ttp, Staples c/d/i   Musculoskeletal: unremarkable with normal function. No embolic signs or cyanosis.    Neuro:  Oriented; moves all 4; no focal deficits  Psychiatric: normal affect and mood, no memory impairment      Patient Vitals for the past 24 hrs:   BP Temp Pulse Resp SpO2 Weight   12/26/21 1158 109/74 98.8 °F (37.1 °C) 69  99 %    12/26/21 0722 112/80 98.2 °F (36.8 °C) 69 18 100 %    12/26/21 0258 101/69 98.5 °F (36.9 °C) 95 18 97 % 216 lb 4.8 oz (98.1 kg)   12/26/21 0004 101/68 98.5 °F (36.9 °C) 60 18 97 %    12/25/21 1957 96/60 98.2 °F (36.8 °C) 64 18 97 %    12/25/21 1530 108/73 97.9 °F (36.6 °C) 63 18 98 %      Labs: Recent Labs     12/26/21  1049 12/24/21  0459 12/24/21  0459   WBC  --   --  13.5*   HGB 7.4*   < > 10.4*   PLT  --   --  209   NA  --   --  138   K  --   --  4.3   CL  --   --  107   CO2  --   --  25   BUN  --   --  11   CREA  --   --  0.98   GLU  --   --  129*    < > = values in this interval not displayed.        Sawyer Pearson, NP

## 2021-12-26 NOTE — PROGRESS NOTES
ERAS End of Shift    3 Days Post-Op     Lovett: No    Bowel Movement: Yes    Bowel Sounds: normal    DIET ADULT ORAL NUTRITION SUPPLEMENT  DIET ADULT     Tolerating Diet?: Yes    Ensure Surgery Immunonutrion Shakes - 2 per day? Yes    Ambulated 3 times. Up to chair for 3 meals.     Lidocaine: No    PRN Pain Medications Used?: No    IS Used: Yes    Ideal body weight: 60.5 kg (133 lb 4.3 oz)     Signed By: Christine Coronado RN     December 26, 2021

## 2021-12-26 NOTE — PROGRESS NOTES
P[facility] End of Shift    3 Days Post-Op     Lovett: No    Bowel Movement: No    Bowel Sounds: normal    DIET ADULT ORAL NUTRITION SUPPLEMENT  DIET ADULT     Tolerating Diet?: Yes    Ensure Surgery Immunonutrion Shakes - 2 per day? Yes, 12/25    Ambulated 3 times. Up to chair for all meals 12/25.     Lidocaine: No    PRN Pain Medications Used?: No    IS Used: Yes    Ideal body weight: 60.5 kg (133 lb 4.3 oz)     Signed By: Heidy Hutchinson RN     December 26, 2021

## 2021-12-26 NOTE — PROGRESS NOTES
END OF SHIFT NOTE:    INTAKE/OUTPUT  12/25 0701 - 12/26 0700  In: 118 [P.O.:657]  Out: 1250 [Urine:1250]  Voiding: YES  Catheter: NO  Drain:              Flatus: Patient does have flatus present. Stool:  0 occurrences. Characteristics:  Stool Assessment  Stool Color: Red  Stool Appearance: Watery,Bloody  Stool Amount: Small  Stool Source/Status: Rectum    Emesis: 0 occurrences. Characteristics:        VITAL SIGNS  Patient Vitals for the past 12 hrs:   Temp Pulse Resp BP SpO2   12/26/21 0258 98.5 °F (36.9 °C) 95 18 101/69 97 %   12/26/21 0004 98.5 °F (36.9 °C) 60 18 101/68 97 %   12/25/21 1957 98.2 °F (36.8 °C) 64 18 96/60 97 %       Pain Assessment  Pain Intensity 1: 0 (12/26/21 0117)  Pain Location 1: Abdomen  Pain Intervention(s) 1: Rest  Patient Stated Pain Goal: 0    Ambulating  Yes    Shift report given to oncoming nurse at the bedside.     Ruth Welch RN

## 2021-12-27 VITALS
RESPIRATION RATE: 18 BRPM | DIASTOLIC BLOOD PRESSURE: 89 MMHG | SYSTOLIC BLOOD PRESSURE: 142 MMHG | BODY MASS INDEX: 33.27 KG/M2 | HEIGHT: 67 IN | TEMPERATURE: 99.1 F | WEIGHT: 212 LBS | OXYGEN SATURATION: 98 % | HEART RATE: 61 BPM

## 2021-12-27 LAB — HISTORY CHECKED?,CKHIST: NORMAL

## 2021-12-27 PROCEDURE — 74011000250 HC RX REV CODE- 250: Performed by: NURSE PRACTITIONER

## 2021-12-27 PROCEDURE — P9016 RBC LEUKOCYTES REDUCED: HCPCS

## 2021-12-27 PROCEDURE — 2709999900 HC NON-CHARGEABLE SUPPLY

## 2021-12-27 PROCEDURE — 30233N1 TRANSFUSION OF NONAUTOLOGOUS RED BLOOD CELLS INTO PERIPHERAL VEIN, PERCUTANEOUS APPROACH: ICD-10-PCS | Performed by: NURSE PRACTITIONER

## 2021-12-27 PROCEDURE — 74011250637 HC RX REV CODE- 250/637: Performed by: SURGERY

## 2021-12-27 PROCEDURE — 86901 BLOOD TYPING SEROLOGIC RH(D): CPT

## 2021-12-27 PROCEDURE — 74011250636 HC RX REV CODE- 250/636: Performed by: NURSE PRACTITIONER

## 2021-12-27 PROCEDURE — 36415 COLL VENOUS BLD VENIPUNCTURE: CPT

## 2021-12-27 PROCEDURE — 86923 COMPATIBILITY TEST ELECTRIC: CPT

## 2021-12-27 PROCEDURE — 36430 TRANSFUSION BLD/BLD COMPNT: CPT

## 2021-12-27 PROCEDURE — C9113 INJ PANTOPRAZOLE SODIUM, VIA: HCPCS | Performed by: NURSE PRACTITIONER

## 2021-12-27 RX ORDER — OXYCODONE HYDROCHLORIDE 5 MG/1
5 TABLET ORAL
Qty: 12 TABLET | Refills: 0 | Status: SHIPPED | OUTPATIENT
Start: 2021-12-27 | End: 2022-01-01

## 2021-12-27 RX ADMIN — LEVOTHYROXINE SODIUM 100 MCG: 0.05 TABLET ORAL at 06:20

## 2021-12-27 RX ADMIN — ACETAMINOPHEN 1000 MG: 500 TABLET ORAL at 06:20

## 2021-12-27 RX ADMIN — NALOXEGOL OXALATE 25 MG: 25 TABLET, FILM COATED ORAL at 06:20

## 2021-12-27 RX ADMIN — ACETAMINOPHEN 1000 MG: 500 TABLET ORAL at 12:27

## 2021-12-27 RX ADMIN — DOCUSATE SODIUM 100 MG: 100 CAPSULE ORAL at 09:13

## 2021-12-27 RX ADMIN — GABAPENTIN 300 MG: 100 CAPSULE ORAL at 09:13

## 2021-12-27 RX ADMIN — SODIUM CHLORIDE 40 MG: 9 INJECTION INTRAMUSCULAR; INTRAVENOUS; SUBCUTANEOUS at 09:12

## 2021-12-27 RX ADMIN — METOPROLOL TARTRATE 25 MG: 25 TABLET, FILM COATED ORAL at 09:13

## 2021-12-27 NOTE — PROGRESS NOTES
ERAS End of Shift    4 Days Post-Op     Lovett: No    Bowel Movement: Yes    Bowel Sounds: normal    DIET ADULT ORAL NUTRITION SUPPLEMENT  DIET ADULT     Tolerating Diet?: Yes    Ensure Surgery Immunonutrition Shakes - 2 per day: Yes    Ambulated 3 times. Up to chair for 3 meals.     Lidocaine: No    PRN Pain Medications Used?: No    IS Used: Yes    Ideal body weight: 60.5 kg (133 lb 4.3 oz)     Signed By: Mauricio Portillo RN     December 27, 2021

## 2021-12-27 NOTE — PROGRESS NOTES
Patient discharging home today. Case Management assessment performed by BIN Thurston RN Case Manager.      Care Management Interventions  Discharge Durable Medical Equipment: No  Physical Therapy Consult: No  Occupational Therapy Consult: No  Confirm Follow Up Transport: Self  The Patient and/or Patient Representative was Provided with a Choice of Provider and Agrees with the Discharge Plan?: No  Freedom of Choice List was Provided with Basic Dialogue that Supports the Patient's Individualized Plan of Care/Goals, Treatment Preferences and Shares the Quality Data Associated with the Providers?: No   Resource Information Provided?: No  Discharge Location  Discharge Placement: Home

## 2021-12-27 NOTE — PROGRESS NOTES
PLAN:  Ambulate 50ft TID  OOB for all meals  Daily weight  SCD/I.S./ Protonix/Lovenox -hold  Soft Diet  Nutritional supplements  Scheduled Celebrex  PRN narcotics  Nausea Control  Lovett DC'd 12/24/21  2U PRBC  DC Home Later Today  F/u in office with Dr. Graylin Bloch next Tuesday        ASSESSMENT:  Admit Date: 12/23/2021   4 Day Post-Op  Procedure(s):  ERAS/ LAPAROSCOPIC ASSISTED RIGHT HEMICOLECTOMY    Principal Problem:    Colon cancer ([address] (12/23/2021)         SUBJECTIVE:  Pt sitting in chair. No  Complaints. Feeling good and wants to go home. Tolerating Soft diet, No nausea/ vomiting. Reports +flatus and +BM x 1 yesterday with visible blood in stool. No additional BM's today. AF, NAD. Hgb 6.8 this am. Has been stable around 7. Pt has received 1 of the 2U of PRBC's ordered. OBJECTIVE:  Constitutional: Alert oriented cooperative patient in no acute distress; appears stated age   Visit Vitals  /84   Pulse (!) 56   Temp 98.5 °F (36.9 °C)   Resp 18   Ht 5' 6.5\" (1.689 m)   Wt 212 lb (96.2 kg)   SpO2 98%   Breastfeeding No   BMI 33.71 kg/m²     Eyes: Sclera are clear. ENMT: no external lesions gross hearing normal; no obvious neck masses, no ear or lip lesions  CV: RRR. Normal perfusion  Resp: No JVD. Breathing is  non-labored; no audible wheezing. GI: soft and non-distended, appropriately ttp, Staples c/d/i   Musculoskeletal: unremarkable with normal function. No embolic signs or cyanosis.    Neuro:  Oriented; moves all 4; no focal deficits  Psychiatric: normal affect and mood, no memory impairment      Patient Vitals for the past 24 hrs:   BP Temp Pulse Resp SpO2 Weight   12/27/21 0547      212 lb (96.2 kg)   12/27/21 0544 118/84 98.5 °F (36.9 °C) (!) 56 18 98 %    12/27/21 0446 111/72        12/27/21 0343 126/79 98.3 °F (36.8 °C) 65 18 100 %    12/27/21 0328 122/80 98.4 °F (36.9 °C) 66 18 99 %    12/27/21 0311      210 lb 3.2 oz (95.3 kg)   12/27/21 0310 106/71 97.8 °F (36.6 °C) 64 17 99 %    12/26/21 2336 109/77 97.7 °F (36.5 °C) 68 17 98 %    12/26/21 1915 104/63 98 °F (36.7 °C) 67 17 97 %    12/26/21 1739 119/79 98.7 °F (37.1 °C) 79 18 99 %    12/26/21 1531 126/83 98.5 °F (36.9 °C) 65 18 99 %    12/26/21 1158 109/74 98.8 °F (37.1 °C) 69 18 99 %      Labs:    Recent Labs     12/26/21  2243   HGB 6.8*       Deion Avila, NP

## 2021-12-27 NOTE — DISCHARGE INSTRUCTIONS
Patient Education        Laparoscopic Bowel Resection: What to Expect at [facility]. had part of your small or large intestine taken out. You are likely to have pain that comes and goes for the next few days. You may feel like you have the flu. You also may have a low fever and feel tired and nauseated. This is common. You should feel better after 1 to 2 weeks and will probably be back to normal in 2 to 4 weeks. Your bowel movements may not be regular for several weeks. Also, you may have some blood in your stool. This care sheet gives you a general idea about how long it will take for you to recover. But each person recovers at a different pace. Follow the steps below to get better as quickly as possible. How can you care for yourself at home? Activity    · Rest when you feel tired. Getting enough sleep will help you recover.     · Try to walk each day. Start by walking a little more than you did the day before. Bit by bit, increase the amount you walk. Walking boosts blood flow and helps prevent pneumonia and constipation.     · Avoid strenuous activities, such as biking, jogging, weight lifting, or aerobic exercise, until your doctor says it is okay.     · Avoid lifting anything that would make you strain. This may include heavy grocery bags and milk containers, a heavy briefcase or backpack, cat litter or dog food bags, a vacuum , or a child.     · Ask your doctor when you can drive again.     · You will probably need to take 2 to 4 weeks off from work. It depends on the type of work you do and how you feel.     · You may shower 24 to 48 hours after surgery, if your doctor says it is okay. Pat the cut (incision) dry. Do not take a bath for the first 2 weeks, or until your doctor tells you it is okay.     · Ask your doctor when it is okay for you to have sex. Diet    · You may not have much appetite after the surgery. But try to eat a healthy diet.  Your doctor will tell you about any foods you should not eat.     · Eat a low-fiber diet for several weeks after surgery. Eat many small meals throughout the day. Add high-fiber foods a little at a time.     · Eat yogurt. It puts good bacteria into your colon and helps prevent diarrhea.     · Try to avoid nuts, seeds, and corn for a while. They may be hard to digest.     · You may need to take vitamins that contain sodium and potassium. Your doctor will tell you whether you should take any vitamins or supplements.     · Drink plenty of fluids to prevent dehydration. Choose water and other clear liquids until you feel better. If you have kidney, heart, or liver disease and have to limit fluids, talk with your doctor before you increase the amount of fluids you drink. Medicines    · Your doctor will tell you if and when you can restart your medicines. You will also be given instructions about taking any new medicines.     · If you take aspirin or some other blood thinner, ask your doctor if and when to start taking it again. Make sure that you understand exactly what your doctor wants you to do.     · Take pain medicines exactly as directed. ? If the doctor gave you a prescription medicine for pain, take it as prescribed. ? If you are not taking a prescription pain medicine, ask your doctor if you can take an over-the-counter medicine.     · If your doctor prescribed antibiotics, take them as directed. Do not stop taking them just because you feel better. You need to take the full course of antibiotics.     · You may need to take some medicines in a different form. You will be told whether to crush pills or take a liquid form of the medicine.     · If your doctor gives you a stool softener, take it as directed. Incision care    · If you have strips of tape on the incisions, leave the tape on for a week or until it falls off.     · Wash the area daily with warm, soapy water and pat it dry.  Don't use hydrogen peroxide or alcohol, which can slow healing. You may cover the area with a gauze bandage if it weeps or rubs against clothing. Change the bandage every day. Follow-up care is a key part of your treatment and safety. Be sure to make and go to all appointments, and call your doctor if you are having problems. It's also a good idea to know your test results and keep a list of the medicines you take. When should you call for help? Call 911 anytime you think you may need emergency care. For example, call if:    · You passed out (lost consciousness).     · You are short of breath. Call your doctor now or seek immediate medical care if:    · You have pain that does not get better after you take pain medicine.     · You cannot pass stool or gas.     · You are sick to your stomach and cannot keep fluids down.     · Bright red blood has soaked through your bandage.     · You have loose stitches, or your incision comes open.     · You have signs of a blood clot in your leg (called a deep vein thrombosis), such as:  ? Pain in your calf, back of the knee, thigh, or groin. ? Redness and swelling in your leg or groin.     · You have signs of infection, such as:  ? Increased pain, swelling, warmth, or redness. ? Red streaks leading from the incision. ? Pus draining from the incision. ? A fever. Watch closely for any changes in your health, and be sure to contact your doctor if you have any problems. Where can you learn more? Go to http://www.gray.com/  Enter H191 in the search box to learn more about \"Laparoscopic Bowel Resection: What to Expect at Home. \"  Current as of: February 10, 2021               Content Version: 13.0  © 2436-4933 [employer]. Care instructions adapted under license by [employer] (which disclaims liability or warranty for this information).  If you have questions about a medical condition or this instruction, always ask your healthcare professional. Kelli Pavon disclaims any warranty or liability for your use of this information.

## 2021-12-27 NOTE — PROGRESS NOTES
RNCM met with patient in room in 221 to discuss discharge planning. Patient alert and oriented in all spheres. Patient lives independently in one level home. Works [employer] and continues to drive. Patient's son lives with her Elign Baker [phone]). Patient denies DME at home. Confirmed demographics, emergency contact,  PCP (Dr Jam Ziegler [phone]) and uses [facility] pharmacy in [place] [address]. Patient with discharge orders today. No needs identified to CM. Family at bedside to transport patient home. Patient has met all milestones and treatment goals. CM will continue to follow until discharged home today.

## 2021-12-27 NOTE — PROGRESS NOTES
Physician Progress Note      PATIENT:               Shivam Brennan  CSN #:                  [id number]  :                       1960  ADMIT DATE:       2021 5:35 AM  DISCH DATE:  RESPONDING  PROVIDER #:        Nikhil STEPHENSON MD          QUERY TEXT:    Pt admitted with Colon cancer. Pt noted to have 3.6 gm drop in HGB . If possible, please document in the progress notes and discharge summary if you are evaluating and/or treating any of the following: The medical record reflects the following:  Risk Factors: 61 YOF, newly dx Colon Cancer,  Laparoscopic right hemicolectomy  Clinical Indicators:  -- HGB 10.4---7.4---7.3---6.8   Postop note: ESTIMATED BLOOD LOSS:  Minimal.   HP: She was found to be anemic with red streaks in stool, and colonoscopy found a large friable ulcerated mass at hepatic flexure, though biopsy only showed TVA, this is undoubtedly malignancy by its appearance. Treatment: Monitoring, transfused 2 U PRBCs,      Thank you,  Vandana Reyes RN,C BSN  Clinical   [email or web address]  Options provided:  -- Chronic blood loss anemia  -- Acute on chronic blood loss anemia  -- Iron deficiency anemia  -- Postoperative acute blood loss anemia  -- Anemia of chronic disease due to Colon Cancer  -- Precipitous drop in Hemoglobin and Hematocrit  -- Other - I will add my own diagnosis  -- Disagree - Not applicable / Not valid  -- Disagree - Clinically unable to determine / Unknown  -- Refer to Clinical Documentation Reviewer    PROVIDER RESPONSE TEXT:    This patient has postoperative acute blood loss anemia. Query created by:  Bianca Cohen on 2021 10:25 AM      Electronically signed by:  Nikhil STEPHENSON MD 2021 12:35 PM

## 2021-12-27 NOTE — DISCHARGE SUMMARY
Physician Discharge Summary     Patient ID:  Radha Asif  [id number]  64 y.o.  1960    Allergies: Bactrim [sulfamethoprim], Codeine, Lortab [hydrocodone-acetaminophen], and Sulfa (sulfonamide antibiotics)    Admit Date: 12/23/2021    Discharge Date: 12/27/2021     HPI: Amisha Fox is a 64 y.o. female who is seen for newly diagnosed right colon cancer in hepatic flexure. She was found to be anemic with red streaks in stool, and colonoscopy found a large friable ulcerated mass at hepatic flexure, though biopsy only showed TVA, this is undoubtedly malignancy by its appearance. She does complain pain at right upper quadrant, she eats fine, but lose weight, about 4 lb over last 3 weeks.      She had right breast cancer in 2017, s/p lumpectomy with chemoradiation, is on arimidex. Other medical issues are reviewed as below. No cardiopulmonary issues. No blood thinners. Past smoker, 30 pack year, quit in 2009. No alcohol abuse. Surgeries: lumpectomy, thyroidectomy, appendectomy, tubal. She has extensive family history of cancer: father esophageal cancer at later 61; PGM ovarian cancer in her 52's; MGM stomach cancer in her 63's; half brother with lung ca; two cousins with breast cancer and lung cancer; uncle with prostate cancer.      Pt underwent Laparoscopic right hemicolectomy 12/23/21 by Dr. Billy Navarro.    * Admission Diagnoses: Malignant neoplasm of colon, unspecified part of colon [facility]) [C18.9]  Colon cancer ([email or web address] [C18.9]    * Discharge Diagnoses:    Hospital Problems as of 12/27/2021 Date Reviewed: 12/17/2021          Codes Class Noted - Resolved POA    * (Principal) [facility] ICD-10-CM: C18.9  ICD-9-CM: 153.9  12/23/2021 - Present Unknown               Admission Condition: Stable    * Discharge Condition: stable    * Procedures: Procedure(s):  ERAS/ LAPAROSCOPIC ASSISTED RIGHT HEMICOLECTOMY    * Hospital Course:   Hospital course was complicated by bloody stools, which added 2 days to the patient's length of stay. Consults: None    Significant Diagnostic Studies: labs    * Disposition: Home    Discharge Medications:   Current Discharge Medication List      START taking these medications    Details   oxyCODONE IR (ROXICODONE) 5 mg immediate release tablet Take 1 Tablet by mouth every four (4) hours as needed for Pain for up to 5 days. Max Daily Amount: 30 mg.  Qty: 12 Tablet, Refills: 0  Start date: 12/27/2021, End date: 1/1/2022    Associated Diagnoses: Malignant neoplasm of hepatic flexure ([facility]         CONTINUE these medications which have NOT CHANGED    Details   ferrous sulfate 324 mg (65 mg iron) tablet Take 324 mg by mouth daily. spironolactone (ALDACTONE) 25 mg tablet Take 12.5 mg by mouth daily. Tiadylt  mg capsule Take 360 mg by mouth daily. anastrozole (ARIMIDEX PO) Take 1 mg by mouth daily. levothyroxine (SYNTHROID) 150 mcg tablet Take 100 mcg by mouth Daily (before breakfast). nebivolol (BYSTOLIC) 20 mg tablet Take 20 mg by mouth every morning. hydrALAZINE (APRESOLINE) 50 mg tablet Take 50 mg by mouth three (3) times daily. ERGOCALCIFEROL, VITAMIN D2, (VITAMIN D2 PO) Take 1 Tablet by mouth daily. Twice a week      b complex vitamins tablet Take 1 Tab by mouth daily. gabapentin (NEURONTIN) 300 mg capsule Take 300 mg by mouth three (3) times daily as needed. * Follow-up Care/Patient Instructions: Activity: Activity as tolerated  Diet: Soft Diet  Wound Care: Keep wound clean and dry    Follow-up Information     Follow up With Specialties Details Why Dora Madera MD General Surgery On 1/4/2022 1:15 [address] [place] [address] [facility]      Noelle Forbes MD Internal Medicine   [address] [facility] [address] [place]  [phone]          Discharge Instructions/Follow-up Plans:   MD Instructions:     Follow-up with Dr. Tahira Norman in 1 week as scheduled. Keep incisions clean and dry, may remain uncovered.   Do not apply lotions, creams or ointments to incisions.     Diet - as tolerated - Soft foods diet. Activity - ambulate - as tolerated - no heavy lifting >10lb. May shower - no tub baths or soaking/submerging.     No driving while taking narcotics. Do not drink alcohol while taking narcotics.   Resume other home medications.      If problems or questions arise, please call our office at [phone].     Greater than 30 minutes were spent discharging the patient         Signed:  Amanda Delgado NP  12/27/2021  11:12 AM

## 2021-12-28 LAB
ABO + RH BLD: NORMAL
BLD PROD TYP BPU: NORMAL
BLD PROD TYP BPU: NORMAL
BLOOD GROUP ANTIBODIES SERPL: NORMAL
BPU ID: NORMAL
BPU ID: NORMAL
CROSSMATCH RESULT,%XM: NORMAL
CROSSMATCH RESULT,%XM: NORMAL
SPECIMEN EXP DATE BLD: NORMAL
STATUS OF UNIT,%ST: NORMAL
STATUS OF UNIT,%ST: NORMAL
UNIT DIVISION, %UDIV: 0
UNIT DIVISION, %UDIV: 0

## 2021-12-28 NOTE — PROGRESS NOTES
D/c instructions reviewed with pt. All questions answered. PIV removed. Ensure surgery provided to pt to take home. Esigned acknowledgement/understanding. Pt d/c from unit via w/c accompanied by staff.

## 2022-01-10 NOTE — PROGRESS NOTES
Post discharge phone call. POD 18.    Per patient: completed Ensure Immunonutrition shakes for 5 days post op. No n/v. Incision C/D/I with steri strips. Appetite is normal. 0-2 BM's per day. Intermittent pain of 4-5 controlled with Tylenol. Patient is active and no complaints at present.      F/U with Dr. Gretchen Kenney was on 1-4-22

## 2022-03-19 PROBLEM — C18.3 MALIGNANT NEOPLASM OF HEPATIC FLEXURE (HCC): Status: ACTIVE | Noted: 2021-12-17

## 2022-03-19 PROBLEM — C18.9 COLON CANCER (HCC): Status: ACTIVE | Noted: 2021-12-23

## 2022-04-26 NOTE — PERIOP NOTES
Pt bradycardic in the 40-50s with lidocaine gtt infusing per ERAS orders. Otherwise vitals stable, pt A&O x4. MD Shalini Avendano notified by Jesenia Watts RN. Per MD, okay with gtt infusing as long as pt is asymptomatic. no

## 2025-02-24 ENCOUNTER — HOSPITAL ENCOUNTER (OUTPATIENT)
Dept: GENERAL RADIOLOGY | Age: 65
Discharge: HOME | End: 2025-02-27
Payer: COMMERCIAL

## 2025-02-24 DIAGNOSIS — M25.562 LEFT KNEE PAIN, UNSPECIFIED CHRONICITY: ICD-10-CM

## 2025-02-24 DIAGNOSIS — M54.2 NECK PAIN: ICD-10-CM

## 2025-02-24 PROCEDURE — 73562 X-RAY EXAM OF KNEE 3: CPT

## 2025-02-24 PROCEDURE — 72040 X-RAY EXAM NECK SPINE 2-3 VW: CPT

## 2025-03-03 ENCOUNTER — OFFICE VISIT (OUTPATIENT)
Dept: ORTHOPEDIC SURGERY | Age: 65
End: 2025-03-03
Payer: COMMERCIAL

## 2025-03-03 ENCOUNTER — TELEPHONE (OUTPATIENT)
Dept: ORTHOPEDIC SURGERY | Age: 65
End: 2025-03-03

## 2025-03-03 VITALS — BODY MASS INDEX: 32.65 KG/M2 | HEIGHT: 67 IN | WEIGHT: 208 LBS

## 2025-03-03 DIAGNOSIS — M47.812 CERVICAL SPONDYLOSIS: Primary | ICD-10-CM

## 2025-03-03 DIAGNOSIS — G95.9 CERVICAL MYELOPATHY (HCC): ICD-10-CM

## 2025-03-03 PROCEDURE — 99204 OFFICE O/P NEW MOD 45 MIN: CPT | Performed by: PHYSICIAN ASSISTANT

## 2025-03-03 PROCEDURE — E0100 CANE ADJUST/FIXED WITH TIP: HCPCS | Performed by: PHYSICIAN ASSISTANT

## 2025-03-03 RX ORDER — GABAPENTIN 300 MG/1
300 CAPSULE ORAL 3 TIMES DAILY
Qty: 90 CAPSULE | Refills: 2 | Status: SHIPPED | OUTPATIENT
Start: 2025-03-03 | End: 2025-06-01

## 2025-03-03 NOTE — PROGRESS NOTES
Name: Ryan Mendoza  YOB: 1960  Gender: female  MRN: [id number]    CC:   Chief Complaint   Patient presents with    New Patient     Cervical spine pain down left arm         HPI:   Ryan Mendoza is a 64 y.o. female with a PMHx of breast cancer, other comorbidities below.         They present here for evaluation of neck pain rating down the left arm and hand to the fingers.  This been going on about a month.  She woke up with the pain after going to work.  She works as a .  She has numbness in the left hand.  She reports over the last several weeks worsening clumsiness of the left hand with dropping things.  She also reports that over the last 2 weeks she has had worsening balance feeling like she is wondering when she is walking.  Some trouble with fine motor skills of the left hand.  She tried some oral steroids which gave her some temporary relief.      Pain Scale: Up to 8 out of 10  ADL's affected: Working, getting dressed  Conservative treatment attempted: Oral steroids, OTC analgesics          Past Medical History Includes:   Past Medical History:   Diagnosis Date    Cancer (HCC)     right breast    Chronic pain     GERD (gastroesophageal reflux disease)     daily meds    Hypertension     Hypothyroid     OA (osteoarthritis)     neck   ,   Past Surgical History:   Procedure Laterality Date    APPENDECTOMY      BREAST SURGERY Right     lumpectomy    CARDIAC CATHETERIZATION      no interventions    HEENT      thyroidectomy    TUBAL LIGATION       Family History:   Family History   Problem Relation Age of Onset    Cancer Father         esophageal    Heart Disease Mother       Social History:   Social History     Tobacco Use    Smoking status: Former    Smokeless tobacco: Never    Tobacco comments:     Quit smoking: quit ~2007   Substance Use Topics    Alcohol use: No       ALLERGIES:   Allergies   Allergen Reactions    Codeine Hives    Hydrocodone-Acetaminophen Hives

## 2025-03-03 NOTE — PATIENT INSTRUCTIONS
14.3  © 2024 [employer].   Care instructions adapted under license by [employer]. If you have questions about a medical condition or this instruction, always ask your healthcare professional. [employer], disclaims any warranty or liability for your use of this information.

## 2025-03-03 NOTE — TELEPHONE ENCOUNTER
MRI CERVICAL SPINE APPROVAL     Status   Current Status: Approved   Validity Period: 3/3/2025 - 4/2/2025   Authorization: [id number] (Cervical Spine MRI)   Patient   Name: MIRIAM BAZAN   Subscriber ID: [id number]   YOB: 1960   Gender: Female   Physician   Name: CINTIA VALENCIA   Provider ID: [id number]      Rendering Provider   Name: [facility]   Phone:    Address: [place] [address]   Fax: Not available   Rendering Provider ID: Not available   [email or web address] User   Name: [email or web address]   Company: [employer]   Username: [id number]   Job Title: MRI AUTHORIZATIONS   Email: [email or web address]   Address: [address] [place] [address]   Supervisor Name: Kesha Ontiveros   Supervisor Email: [email or web address]   Details   Date of Service: 3/7/2025   Auto Accident: No   Pend/Reject Code: E8   Out of State: n/a   Release of Info Code: Y   Out of Country: n/a   Employment Related: No   Another Party: No   Level of Service: Not Urgent   Exams: Cervical Spine MRI  - CPTs: 0698T, 74509, 15555, 53397   ICD10: M47.812  G95.9   Reason: M47.812 (ICD-10-CM) - Cervical spondylosis G95.9 (ICD-10-CM) - Cervical myelopathy (HCC

## 2025-03-03 NOTE — PROGRESS NOTES
Patient was fitted and instruted on the use of a cane. The cane was adjusted to the patient's height and arm length. Patient walked around with the cane to insure it was set at a comfortable height. I explained that the cane needs to be in the opposite hand of the injury.Patient read and signed documenting they understand and agree to [facility]'s current DME return policy.

## 2025-03-13 ENCOUNTER — OFFICE VISIT (OUTPATIENT)
Dept: ORTHOPEDIC SURGERY | Age: 65
End: 2025-03-13
Payer: COMMERCIAL

## 2025-03-13 VITALS — HEIGHT: 67 IN | WEIGHT: 208 LBS | BODY MASS INDEX: 32.65 KG/M2

## 2025-03-13 DIAGNOSIS — M48.02 FORAMINAL STENOSIS OF CERVICAL REGION: Primary | ICD-10-CM

## 2025-03-13 DIAGNOSIS — R59.0 ENLARGED LYMPH NODE IN NECK: ICD-10-CM

## 2025-03-13 DIAGNOSIS — M48.02 FORAMINAL STENOSIS OF CERVICAL REGION: ICD-10-CM

## 2025-03-13 DIAGNOSIS — R26.89 POOR BALANCE: Primary | ICD-10-CM

## 2025-03-13 PROCEDURE — 99214 OFFICE O/P EST MOD 30 MIN: CPT | Performed by: PHYSICIAN ASSISTANT

## 2025-03-13 NOTE — PROGRESS NOTES
03/13/25        Name: Ryan Mendoza  YOB: 1960  Gender: female  MRN: [id number]        MRI/IMAGING/STUDY FOLLOWUP    CC: Follow-up (MRI results)       HPI: Ryan Mendoza is a 64 y.o. female who returns for Follow-up (MRI results)           Patient presents to the office today for follow-up to review MRI results.          Meds/PSH/PMH/FH/SH: This information has been reviewed.      ALLERGIES:   Allergies   Allergen Reactions    Codeine Hives    Hydrocodone-Acetaminophen Hives    Sulfa Antibiotics Hives    Sulfamethoxazole-Trimethoprim Hives              Physical Examination:            Imaging:         MRI Result (most recent):  MRI CERVICAL SPINE WO CONTRAST 03/07/2025    Narrative  EXAMINATION: MRI CERVICAL SPINE 3/7/2025 10:02 AM    ACCESSION NUMBER: [id number]    INDICATION: Left arm pain, poor balance, neck pain radiating into the left arm  and hand for 3 or 4 weeks    COMPARISON: Cervical spine MRI 8/4/2017, cervical spine x-rays 2/24/2025    TECHNIQUE: Multi-sequence, multi-planar MR images were obtained of the cervical  spine without the administration of intravenous contrast.    FINDINGS:    There is normal alignment of the craniocervical articulation. There is  straightening and slight reversal of the normal cervical lordosis. There is a  dextroconvex curvature at the cervicothoracic junction.    Chronic degenerative endplate remodeling from C3-C4 through C7-T1. There are no  suspicious osseous lesions.    There is no definite abnormal cervical spinal cord signal.    The visualized skull base structures are unremarkable.    There is a 7 mm lymph node in the lower right neck anterior to the carotid space  (axial T2 weighted image 21). This was not present on the prior exam.      Level specific findings:    C2-3: Mild disc degeneration. No significant canal or foraminal stenosis.    C3-4: Small disc osteophyte complex, mildly impressing upon the ventral  subarachnoid space.

## 2025-04-10 ENCOUNTER — OFFICE VISIT (OUTPATIENT)
Dept: NEUROSURGERY | Age: 65
End: 2025-04-10
Payer: COMMERCIAL

## 2025-04-10 ENCOUNTER — HOSPITAL ENCOUNTER (OUTPATIENT)
Dept: GENERAL RADIOLOGY | Age: 65
Discharge: HOME | End: 2025-04-12
Payer: COMMERCIAL

## 2025-04-10 VITALS
OXYGEN SATURATION: 97 % | HEART RATE: 56 BPM | WEIGHT: 208 LBS | SYSTOLIC BLOOD PRESSURE: 130 MMHG | DIASTOLIC BLOOD PRESSURE: 88 MMHG | HEIGHT: 67 IN | TEMPERATURE: 97.4 F | BODY MASS INDEX: 32.65 KG/M2

## 2025-04-10 DIAGNOSIS — M48.02 FORAMINAL STENOSIS OF CERVICAL REGION: ICD-10-CM

## 2025-04-10 DIAGNOSIS — M54.12 CERVICAL RADICULOPATHY: ICD-10-CM

## 2025-04-10 DIAGNOSIS — M47.9 SPONDYLOSIS: Primary | ICD-10-CM

## 2025-04-10 DIAGNOSIS — M43.12 SPONDYLOLISTHESIS OF CERVICAL REGION: ICD-10-CM

## 2025-04-10 PROCEDURE — 99204 OFFICE O/P NEW MOD 45 MIN: CPT | Performed by: STUDENT IN AN ORGANIZED HEALTH CARE EDUCATION/TRAINING PROGRAM

## 2025-04-10 PROCEDURE — 72040 X-RAY EXAM NECK SPINE 2-3 VW: CPT

## 2025-04-10 NOTE — PROGRESS NOTES
of breath and wheezing.    Cardiovascular: Negative for chest pain, palpitations and leg swelling.  Gastrointestinal: Negative for abdominal pain, blood in stool, constipation, diarrhea, nausea, vomiting and trouble swallowing.  Genitourinary: Negative for difficulty urinating and pelvic pain.  Musculoskeletal: Positive for neck pain and arm pain  Skin: Negative for color change and rash.  Hematological: Does not bruise/bleed easily.  Neurological: Negative for dizziness, weakness, light-headedness,  and headaches.  Positive for numbness  Psychiatric/Behavioral: Negative for agitation and confusion.            Physical Examination    /88 (BP Site: Left Upper Arm, Patient Position: Sitting, BP Cuff Size: Large Adult)   Pulse 56   Temp 97.4 °F (36.3 °C) (Temporal)   Ht 1.689 m (5' 6.5\")   Wt 94.3 kg (208 lb)   SpO2 97%   BMI 33.07 kg/m²          Physical Exam                 No evidence of worsening of pain on Spurling's maneuver.    No reproduction of sx on SLR.    No evidence of lower extremity spasticity.      Motor    Upper Extremity Right Left   Deltoid (C5,6) 5 5   Biceps (C5,6) 5 5   Wrist extension (C6,7) 5 5   Triceps (C7,8) 5 5   Grasp (C8,T1) 5 5   Hand intrinsics (C8,T1) 5 5       Reflex Level Grade   Biceps C5-6 R: 2+  L:2+   Brachioradialis C5-6 R: 2+  L:2+   Triceps C7-8 R: 2+  L:2+         Lower Extremity Right Left   Illiopsoas (L2,3) 5 5   Quadriceps (L3,4) 5 5   Dorsiflexion (L4,5) 5 5   EHL (L5,S1) 5 5   Gastrocnemius (S1,2) 5 5     Reflex Level Grade   Knee jerk L3-4 R: 2+  L:2+   Ankle jerk S1 R: 2+  L:2+       Sensory Function    Normal sensation bilaterally to light touch and pinprick in the upper and lower extremities      Pathologic reflexes    Guzman's test is negative         Gait and Functional Evaluation:      Ambulatory aids: Independently ambulatory without assistance                 IMAGES:       MRI Result (most recent):  MRI CERVICAL SPINE WO CONTRAST

## (undated) DEVICE — REM POLYHESIVE ADULT PATIENT RETURN ELECTRODE: Brand: VALLEYLAB

## (undated) DEVICE — MARYLAND JAW LAPAROSCOPIC SEALER/DIVIDER COATED: Brand: LIGASURE

## (undated) DEVICE — SUTURE PDS II SZ 0 L60IN ABSRB VLT L65MM TP-1 1/2 CIR Z991G

## (undated) DEVICE — SYRINGE IRRIG 60ML SFT PLIABLE BLB EZ TO GRP 1 HND USE W/

## (undated) DEVICE — NEEDLE HYPO 25GA L1.5IN BLU POLYPR HUB S STL REG BVL STR

## (undated) DEVICE — LAPAROSCOPIC TROCAR SLEEVE/SINGLE USE: Brand: KII® OPTICAL ACCESS SYSTEM

## (undated) DEVICE — YANKAUER,BULB TIP,W/O VENT,RIGID,STERILE: Brand: MEDLINE

## (undated) DEVICE — SUTURE PERMAHAND SZ 0 L30IN NONABSORBABLE BLK SILK BRAID A306H

## (undated) DEVICE — GENERAL LAPAROSCOPY: Brand: MEDLINE INDUSTRIES, INC.

## (undated) DEVICE — SPONGE LAP 18X18IN STRL -- 5/PK

## (undated) DEVICE — STAPLER INT L75MM CUT LN L73MM STPL LN L77MM BLU B FRM 8

## (undated) DEVICE — GARMENT,MEDLINE,DVT,INT,CALF,MED, GEN2: Brand: MEDLINE

## (undated) DEVICE — Device

## (undated) DEVICE — TROCAR: Brand: KII® SLEEVE

## (undated) DEVICE — INTENDED FOR TISSUE SEPARATION, AND OTHER PROCEDURES THAT REQUIRE A SHARP SURGICAL BLADE TO PUNCTURE OR CUT.: Brand: BARD-PARKER SAFETY BLADES SIZE 15, STERILE

## (undated) DEVICE — SUTURE PERMAHAND SZ 3-0 L18IN NONABSORBABLE BLK L26MM SH C013D

## (undated) DEVICE — PLASTIC ADHESIVE BANDAGE: Brand: CURITY

## (undated) DEVICE — TUBING INSUFFLATION SMK EVAC HI FLO SET PNEUMOCLEAR

## (undated) DEVICE — 2, DISPOSABLE SUCTION/IRRIGATOR WITHOUT DISPOSABLE TIP: Brand: STRYKEFLOW

## (undated) DEVICE — TRAY CATH 16F URIN MTR LTX -- CONVERT TO ITEM 363111

## (undated) DEVICE — LOGICUT SCISSOR LENGTH 320MM: Brand: LOGI - LAPAROSCOPIC INSTRUMENT SYSTEM

## (undated) DEVICE — GOWN,PREVENTION PLUS,XLN/XL,ST,24/CS: Brand: MEDLINE

## (undated) DEVICE — VISUALIZATION SYSTEM: Brand: CLEARIFY

## (undated) DEVICE — SUTURE SZ 0 27IN 5/8 CIR UR-6  TAPER PT VIOLET ABSRB VICRYL J603H

## (undated) DEVICE — RELOAD STPL L75MM OPN H3.8MM CLS 1.5MM WIRE DIA0.2MM REG

## (undated) DEVICE — STAPLER INT L60MM REG TISS BLU B FRM 8 FIRING 2 ROW AUTO

## (undated) DEVICE — PREP SKN CHLRAPRP APL 26ML STR --

## (undated) DEVICE — TUBING, SUCTION, 1/4" X 10', STRAIGHT: Brand: MEDLINE

## (undated) DEVICE — BLADE ASSEMB CLP HAIR FINE --

## (undated) DEVICE — SOLUTION IRRIG 3000ML H2O STRL BAG

## (undated) DEVICE — 2000CC GUARDIAN II: Brand: GUARDIAN

## (undated) DEVICE — GLOVE SURG SZ 6.5 L11.2IN THK8.6MIL LT BRN LTX FREE

## (undated) DEVICE — TROCARS: Brand: KII® BLUNT TIP ACCESS SYSTEM

## (undated) DEVICE — BUTTON SWITCH PENCIL BLADE ELECTRODE, HOLSTER: Brand: EDGE

## (undated) DEVICE — WOUND RETRACTOR AND PROTECTOR: Brand: ALEXIS O WOUND PROTECTOR-RETRACTOR

## (undated) DEVICE — BASIC SINGLE BASIN-LF: Brand: MEDLINE INDUSTRIES, INC.

## (undated) DEVICE — GAUZE,SPONGE,4"X4",16PLY,STRL,LF,10/TRAY: Brand: MEDLINE